# Patient Record
Sex: MALE | Race: BLACK OR AFRICAN AMERICAN | NOT HISPANIC OR LATINO | URBAN - METROPOLITAN AREA
[De-identification: names, ages, dates, MRNs, and addresses within clinical notes are randomized per-mention and may not be internally consistent; named-entity substitution may affect disease eponyms.]

---

## 2018-05-09 ENCOUNTER — EMERGENCY (EMERGENCY)
Facility: HOSPITAL | Age: 23
LOS: 1 days | Discharge: HOME | End: 2018-05-09
Attending: EMERGENCY MEDICINE | Admitting: EMERGENCY MEDICINE
Payer: COMMERCIAL

## 2018-05-09 VITALS
OXYGEN SATURATION: 100 % | DIASTOLIC BLOOD PRESSURE: 72 MMHG | RESPIRATION RATE: 16 BRPM | TEMPERATURE: 98 F | SYSTOLIC BLOOD PRESSURE: 134 MMHG | HEART RATE: 60 BPM

## 2018-05-09 LAB
ALBUMIN SERPL ELPH-MCNC: 4.3 G/DL — SIGNIFICANT CHANGE UP (ref 3.3–5)
ALP SERPL-CCNC: 46 U/L — SIGNIFICANT CHANGE UP (ref 40–120)
ALT FLD-CCNC: 16 U/L — SIGNIFICANT CHANGE UP (ref 4–41)
AST SERPL-CCNC: 15 U/L — SIGNIFICANT CHANGE UP (ref 4–40)
BASOPHILS # BLD AUTO: 0.02 K/UL — SIGNIFICANT CHANGE UP (ref 0–0.2)
BASOPHILS NFR BLD AUTO: 0.4 % — SIGNIFICANT CHANGE UP (ref 0–2)
BILIRUB SERPL-MCNC: 0.4 MG/DL — SIGNIFICANT CHANGE UP (ref 0.2–1.2)
BUN SERPL-MCNC: 13 MG/DL — SIGNIFICANT CHANGE UP (ref 7–23)
CALCIUM SERPL-MCNC: 9.1 MG/DL — SIGNIFICANT CHANGE UP (ref 8.4–10.5)
CHLORIDE SERPL-SCNC: 102 MMOL/L — SIGNIFICANT CHANGE UP (ref 98–107)
CO2 SERPL-SCNC: 29 MMOL/L — SIGNIFICANT CHANGE UP (ref 22–31)
CREAT SERPL-MCNC: 0.93 MG/DL — SIGNIFICANT CHANGE UP (ref 0.5–1.3)
EOSINOPHIL # BLD AUTO: 0.77 K/UL — HIGH (ref 0–0.5)
EOSINOPHIL NFR BLD AUTO: 16.6 % — HIGH (ref 0–6)
GLUCOSE SERPL-MCNC: 98 MG/DL — SIGNIFICANT CHANGE UP (ref 70–99)
HCT VFR BLD CALC: 43.3 % — SIGNIFICANT CHANGE UP (ref 39–50)
HGB BLD-MCNC: 14.8 G/DL — SIGNIFICANT CHANGE UP (ref 13–17)
IMM GRANULOCYTES # BLD AUTO: 0.01 # — SIGNIFICANT CHANGE UP
IMM GRANULOCYTES NFR BLD AUTO: 0.2 % — SIGNIFICANT CHANGE UP (ref 0–1.5)
LIDOCAIN IGE QN: 16.6 U/L — SIGNIFICANT CHANGE UP (ref 7–60)
LYMPHOCYTES # BLD AUTO: 1.51 K/UL — SIGNIFICANT CHANGE UP (ref 1–3.3)
LYMPHOCYTES # BLD AUTO: 32.5 % — SIGNIFICANT CHANGE UP (ref 13–44)
MCHC RBC-ENTMCNC: 27.5 PG — SIGNIFICANT CHANGE UP (ref 27–34)
MCHC RBC-ENTMCNC: 34.2 % — SIGNIFICANT CHANGE UP (ref 32–36)
MCV RBC AUTO: 80.3 FL — SIGNIFICANT CHANGE UP (ref 80–100)
MONOCYTES # BLD AUTO: 0.38 K/UL — SIGNIFICANT CHANGE UP (ref 0–0.9)
MONOCYTES NFR BLD AUTO: 8.2 % — SIGNIFICANT CHANGE UP (ref 2–14)
NEUTROPHILS # BLD AUTO: 1.96 K/UL — SIGNIFICANT CHANGE UP (ref 1.8–7.4)
NEUTROPHILS NFR BLD AUTO: 42.1 % — LOW (ref 43–77)
NRBC # FLD: 0 — SIGNIFICANT CHANGE UP
PLATELET # BLD AUTO: 267 K/UL — SIGNIFICANT CHANGE UP (ref 150–400)
PMV BLD: 9.2 FL — SIGNIFICANT CHANGE UP (ref 7–13)
POTASSIUM SERPL-MCNC: 4.2 MMOL/L — SIGNIFICANT CHANGE UP (ref 3.5–5.3)
POTASSIUM SERPL-SCNC: 4.2 MMOL/L — SIGNIFICANT CHANGE UP (ref 3.5–5.3)
PROT SERPL-MCNC: 7 G/DL — SIGNIFICANT CHANGE UP (ref 6–8.3)
RBC # BLD: 5.39 M/UL — SIGNIFICANT CHANGE UP (ref 4.2–5.8)
RBC # FLD: 12.7 % — SIGNIFICANT CHANGE UP (ref 10.3–14.5)
SODIUM SERPL-SCNC: 141 MMOL/L — SIGNIFICANT CHANGE UP (ref 135–145)
WBC # BLD: 4.65 K/UL — SIGNIFICANT CHANGE UP (ref 3.8–10.5)
WBC # FLD AUTO: 4.65 K/UL — SIGNIFICANT CHANGE UP (ref 3.8–10.5)

## 2018-05-09 PROCEDURE — 99283 EMERGENCY DEPT VISIT LOW MDM: CPT

## 2018-05-09 RX ORDER — ONDANSETRON 8 MG/1
4 TABLET, FILM COATED ORAL ONCE
Qty: 0 | Refills: 0 | Status: COMPLETED | OUTPATIENT
Start: 2018-05-09 | End: 2018-05-09

## 2018-05-09 RX ORDER — FAMOTIDINE 10 MG/ML
20 INJECTION INTRAVENOUS ONCE
Qty: 0 | Refills: 0 | Status: COMPLETED | OUTPATIENT
Start: 2018-05-09 | End: 2018-05-09

## 2018-05-09 RX ADMIN — FAMOTIDINE 20 MILLIGRAM(S): 10 INJECTION INTRAVENOUS at 08:56

## 2018-05-09 RX ADMIN — Medication 30 MILLILITER(S): at 08:56

## 2018-05-09 RX ADMIN — ONDANSETRON 4 MILLIGRAM(S): 8 TABLET, FILM COATED ORAL at 08:56

## 2018-05-09 NOTE — ED ADULT TRIAGE NOTE - CHIEF COMPLAINT QUOTE
Pt. with c/o abdominal pain x 5 days acc with nausea.  Pt. stated he took a laxative and started having some loose stools.  Last stated his last normal BM 3 days ago.

## 2018-05-09 NOTE — ED PROVIDER NOTE - PROGRESS NOTE DETAILS
pain improved, tolerating po, All results d/w patient and copies given with instructions to bring with them to their follow up appointment.  The patient was given verbal and written discharge instructions Specifically, instructions when to return to the ED and to seek follow-up from their pcp within 1-2 days. GI follow-up was discussed, including how to make an appointment.  Instructions were discussed in simple, plain language and was understood by the patient. The patient understands that should their symptoms worsen or any new symptoms arise, they should return to the ED immediately for further evaluation.  Vss, NAD, tolerating po and ambulating steadily at discharge.

## 2018-05-09 NOTE — ED PROVIDER NOTE - MEDICAL DECISION MAKING DETAILS
24 y/o M w/ 5 days of intermittent abd pain, constipation followed by diarrhea after using laxative. Well appearing. Tolerating PO. Benign exam. Will check labs, symptomatically treat and reassess. Likely will require GI follow up.

## 2018-05-09 NOTE — ED PROVIDER NOTE - OBJECTIVE STATEMENT
22 y/o M w/ no significant PMHx, presents to the ED c/o intermittent abd pain x6 days w/ associated nausea. Pain is intermittent and worse after eating. Pt also reports fever (Tmax 101F) and chills. Pt admits to mild constipation which was followed by diarrhea after using laxative 2 days ago. Pt is tolerating PO. Denies vomiting, back pain, dysuria or any other complaints. 24 y/o M w/ no significant PMHx, presents to the ED c/o intermittent abd pain x6 days w/ associated nausea. Pain is intermittent and worse after eating, worse in epigastrium but radiates diffusely. Pt also with chills. Pt admits to mild constipation which was followed by diarrhea after using laxative 2 days ago, now improved. Pt is tolerating PO. Denies vomiting, back pain, dysuria or any other complaints. No recent travel or sick contacts. 22 y/o M w/ no significant PMHx, presents to the ED c/o intermittent abd pain x6 days w/ associated nausea. Pain is intermittent and worse after eating, worse in epigastrium but radiates diffusely. Pt admits to mild constipation which was followed by diarrhea after using laxative 2 days ago, now improved. Pt is tolerating PO. Denies vomiting, back pain, dysuria or any other complaints. No recent travel or sick contacts.

## 2018-05-23 ENCOUNTER — EMERGENCY (EMERGENCY)
Facility: HOSPITAL | Age: 23
LOS: 1 days | Discharge: HOME | End: 2018-05-23
Attending: EMERGENCY MEDICINE | Admitting: EMERGENCY MEDICINE
Payer: COMMERCIAL

## 2018-05-23 VITALS
DIASTOLIC BLOOD PRESSURE: 74 MMHG | SYSTOLIC BLOOD PRESSURE: 139 MMHG | HEART RATE: 104 BPM | RESPIRATION RATE: 18 BRPM | OXYGEN SATURATION: 100 % | TEMPERATURE: 100 F

## 2018-05-23 LAB
BASOPHILS # BLD AUTO: 0.04 K/UL — SIGNIFICANT CHANGE UP (ref 0–0.2)
BASOPHILS NFR BLD AUTO: 0.4 % — SIGNIFICANT CHANGE UP (ref 0–2)
EOSINOPHIL # BLD AUTO: 1.49 K/UL — HIGH (ref 0–0.5)
EOSINOPHIL NFR BLD AUTO: 15.5 % — HIGH (ref 0–6)
HCT VFR BLD CALC: 41.8 % — SIGNIFICANT CHANGE UP (ref 39–50)
HGB BLD-MCNC: 14.2 G/DL — SIGNIFICANT CHANGE UP (ref 13–17)
IMM GRANULOCYTES # BLD AUTO: 0.03 # — SIGNIFICANT CHANGE UP
IMM GRANULOCYTES NFR BLD AUTO: 0.3 % — SIGNIFICANT CHANGE UP (ref 0–1.5)
LYMPHOCYTES # BLD AUTO: 1.21 K/UL — SIGNIFICANT CHANGE UP (ref 1–3.3)
LYMPHOCYTES # BLD AUTO: 12.6 % — LOW (ref 13–44)
MCHC RBC-ENTMCNC: 26.9 PG — LOW (ref 27–34)
MCHC RBC-ENTMCNC: 34 % — SIGNIFICANT CHANGE UP (ref 32–36)
MCV RBC AUTO: 79.2 FL — LOW (ref 80–100)
MONOCYTES # BLD AUTO: 0.73 K/UL — SIGNIFICANT CHANGE UP (ref 0–0.9)
MONOCYTES NFR BLD AUTO: 7.6 % — SIGNIFICANT CHANGE UP (ref 2–14)
NEUTROPHILS # BLD AUTO: 6.13 K/UL — SIGNIFICANT CHANGE UP (ref 1.8–7.4)
NEUTROPHILS NFR BLD AUTO: 63.6 % — SIGNIFICANT CHANGE UP (ref 43–77)
NRBC # FLD: 0 — SIGNIFICANT CHANGE UP
PLATELET # BLD AUTO: 256 K/UL — SIGNIFICANT CHANGE UP (ref 150–400)
PMV BLD: 9.9 FL — SIGNIFICANT CHANGE UP (ref 7–13)
RBC # BLD: 5.28 M/UL — SIGNIFICANT CHANGE UP (ref 4.2–5.8)
RBC # FLD: 12.9 % — SIGNIFICANT CHANGE UP (ref 10.3–14.5)
WBC # BLD: 9.63 K/UL — SIGNIFICANT CHANGE UP (ref 3.8–10.5)
WBC # FLD AUTO: 9.63 K/UL — SIGNIFICANT CHANGE UP (ref 3.8–10.5)

## 2018-05-23 PROCEDURE — 99285 EMERGENCY DEPT VISIT HI MDM: CPT

## 2018-05-23 RX ORDER — ACETAMINOPHEN 500 MG
650 TABLET ORAL ONCE
Qty: 0 | Refills: 0 | Status: COMPLETED | OUTPATIENT
Start: 2018-05-23 | End: 2018-05-23

## 2018-05-23 RX ORDER — MORPHINE SULFATE 50 MG/1
4 CAPSULE, EXTENDED RELEASE ORAL ONCE
Qty: 0 | Refills: 0 | Status: DISCONTINUED | OUTPATIENT
Start: 2018-05-23 | End: 2018-05-23

## 2018-05-23 RX ORDER — SODIUM CHLORIDE 9 MG/ML
1000 INJECTION INTRAMUSCULAR; INTRAVENOUS; SUBCUTANEOUS ONCE
Qty: 0 | Refills: 0 | Status: COMPLETED | OUTPATIENT
Start: 2018-05-23 | End: 2018-05-23

## 2018-05-23 RX ADMIN — Medication 650 MILLIGRAM(S): at 23:36

## 2018-05-23 RX ADMIN — MORPHINE SULFATE 4 MILLIGRAM(S): 50 CAPSULE, EXTENDED RELEASE ORAL at 23:35

## 2018-05-23 RX ADMIN — Medication 650 MILLIGRAM(S): at 23:50

## 2018-05-23 RX ADMIN — SODIUM CHLORIDE 1000 MILLILITER(S): 9 INJECTION INTRAMUSCULAR; INTRAVENOUS; SUBCUTANEOUS at 23:35

## 2018-05-23 RX ADMIN — MORPHINE SULFATE 4 MILLIGRAM(S): 50 CAPSULE, EXTENDED RELEASE ORAL at 23:40

## 2018-05-23 NOTE — ED PROVIDER NOTE - PROGRESS NOTE DETAILS
LEANNE ACOSTA:  Pt notes feeling better.  Pt requesting to go home.  CT negative for colitis.  Pt has not had any diarrhea in ED.  Pt medically stable for discharge. Pt to follow up with PMD and gastroenterology (referral list provided).  Repeat abdominal exam:  Abdomen soft, nontender without guarding or rigidity; normoactive bowel sounds.

## 2018-05-23 NOTE — ED ADULT NURSE NOTE - OBJECTIVE STATEMENT
Pt rcvd to INT5 c/o generalized abd pain x4 weeks.  Was seen in ED x2 wks ago, had lab workup and nothing found emergent.  Pt returns for non-improving symptoms.  Also reports x2 days of chest congestion and fevers/chills.  Oral temp 100.2 at present, HR Stachy 100.  Resps even/unlabored on RA, BP Stable, pt well appearing.  No significant pmhx or hx abdominal surgeries,  no hx urinary symptoms.  IVL placed to R AC #20g, labs drawn/sent, medicated as ordered, IVF NS Bolus infusing.  Pt given po contrast, family and pt updated to plan of care - awaiting CT abd and lab results.  Will CTM closely.

## 2018-05-23 NOTE — ED PROVIDER NOTE - SHIFT CHANGE DETAILS
I have signed over this patient to the above attending physician. Pertinent history, physical exam findings and workup thus far in the ED have been discussed. The pending tests and plan, including CTAP were signed over.  All questions from the above attending physician have been answered.

## 2018-05-23 NOTE — ED PROVIDER NOTE - OBJECTIVE STATEMENT
22 y/o M with no PMHx p/w recurrent sx of diffuse abdominal pain similar to previous presentation 2 weeks prior. Pt received blood workup and GI meds at that time with mild improvement of sx. Pt notes he is unable to tolerate PO a/w decrease urinary output. He notes the abdominal pain is worse in the periumbilical region and left side of the abdomen. Pt also expresses he feels dizzy when ambulating, He notes he drinks plenty of fluids but still notices his urine to be dark yellow. Denies N/V/D, F/C, CP, SOB or any other acute complaints.

## 2018-05-23 NOTE — ED PROVIDER NOTE - CARE PLAN
Principal Discharge DX:	Abdominal pain  Assessment and plan of treatment:	Advance activity as tolerated.  Continue all previously prescribed medications as directed unless otherwise instructed.  Take probiotics as directed.  Take Motrin (also sold as Advil or Ibuprofen) 400-600 mg (two or three 200 mg over the counter pills) every 8 hours as needed for moderate pain -- take with food. Take Tylenol 650mg (Two 325 mg pills) every 4-6 hours as needed for pain. Follow up with your primary care physician and gastroenterology (referral list provided) in 48-72 hours- bring copies of your results.  Return to the ER for worsening or persistent symptoms, including but no limited to fevers, bloody stools, worsening/persistent abdominal pain and/or ANY NEW OR CONCERNING SYMPTOMS. If you have issues obtaining follow up, please call: [phone] to obtain a doctor or specialist who takes your insurance in your area.  You may call [phone] to make an appointment with the internal medicine clinic.

## 2018-05-23 NOTE — ED PROVIDER NOTE - ATTENDING CONTRIBUTION TO CARE
I performed a face to face bedside interview with patient regarding history of present illness, review of symptoms and past medical history. I completed an independent physical exam.  I have discussed patient's plan of care.   I agree with note as stated above, having amended the EMR as needed to reflect my findings. I have discussed the assessment and plan of care.  This includes during the time I functioned as the attending physician for this patient.  Attending Contribution to Care: agree with plan of PA. pt was initially evaluated by self and signed out to dr wade. pt is pending ct for final disposition. labs wnl with no acute findings.

## 2018-05-24 VITALS
OXYGEN SATURATION: 100 % | SYSTOLIC BLOOD PRESSURE: 101 MMHG | HEART RATE: 78 BPM | DIASTOLIC BLOOD PRESSURE: 50 MMHG | TEMPERATURE: 99 F

## 2018-05-24 LAB
ALBUMIN SERPL ELPH-MCNC: 4.2 G/DL — SIGNIFICANT CHANGE UP (ref 3.3–5)
ALP SERPL-CCNC: 52 U/L — SIGNIFICANT CHANGE UP (ref 40–120)
ALT FLD-CCNC: 14 U/L — SIGNIFICANT CHANGE UP (ref 4–41)
AST SERPL-CCNC: 17 U/L — SIGNIFICANT CHANGE UP (ref 4–40)
BILIRUB SERPL-MCNC: 0.7 MG/DL — SIGNIFICANT CHANGE UP (ref 0.2–1.2)
BUN SERPL-MCNC: 9 MG/DL — SIGNIFICANT CHANGE UP (ref 7–23)
CALCIUM SERPL-MCNC: 9.1 MG/DL — SIGNIFICANT CHANGE UP (ref 8.4–10.5)
CHLORIDE SERPL-SCNC: 97 MMOL/L — LOW (ref 98–107)
CO2 SERPL-SCNC: 26 MMOL/L — SIGNIFICANT CHANGE UP (ref 22–31)
CREAT SERPL-MCNC: 1.06 MG/DL — SIGNIFICANT CHANGE UP (ref 0.5–1.3)
CRP SERPL-MCNC: 4.4 MG/L — SIGNIFICANT CHANGE UP
ERYTHROCYTE [SEDIMENTATION RATE] IN BLOOD: 2 MM/HR — SIGNIFICANT CHANGE UP (ref 1–15)
GLUCOSE SERPL-MCNC: 97 MG/DL — SIGNIFICANT CHANGE UP (ref 70–99)
LIDOCAIN IGE QN: 13.8 U/L — SIGNIFICANT CHANGE UP (ref 7–60)
POTASSIUM SERPL-MCNC: 3.7 MMOL/L — SIGNIFICANT CHANGE UP (ref 3.5–5.3)
POTASSIUM SERPL-SCNC: 3.7 MMOL/L — SIGNIFICANT CHANGE UP (ref 3.5–5.3)
PROT SERPL-MCNC: 7.1 G/DL — SIGNIFICANT CHANGE UP (ref 6–8.3)
SODIUM SERPL-SCNC: 135 MMOL/L — SIGNIFICANT CHANGE UP (ref 135–145)

## 2018-05-24 PROCEDURE — 74177 CT ABD & PELVIS W/CONTRAST: CPT | Mod: 26

## 2018-05-24 RX ORDER — LACTOBACILLUS ACIDOPHILUS 100MM CELL
1 CAPSULE ORAL
Qty: 21 | Refills: 0
Start: 2018-05-24 | End: 2018-05-30

## 2018-05-24 NOTE — ED ADULT NURSE REASSESSMENT NOTE - NS ED NURSE REASSESS COMMENT FT1
Pt reassessed, improved, states desire to go home following multiple medications and ED interventions.  PA Kenn @ bedside to review dc instructions, iv access dc'd per routine at time of exit by PA, follow up instructed by PA.

## 2019-03-21 NOTE — ED PROVIDER NOTE - NS_ATTENDINGSCRIBE_ED_ALL_ED
Noted pt is running late for appointment. GRISELDA Leung RN   I personally performed the service described in the documentation recorded by the scribe in my presence, and it accurately and completely records my words and actions.

## 2019-05-07 ENCOUNTER — EMERGENCY (EMERGENCY)
Facility: HOSPITAL | Age: 24
LOS: 1 days | Discharge: HOME | End: 2019-05-07
Attending: EMERGENCY MEDICINE | Admitting: EMERGENCY MEDICINE
Payer: COMMERCIAL

## 2019-05-07 VITALS
HEART RATE: 81 BPM | OXYGEN SATURATION: 100 % | SYSTOLIC BLOOD PRESSURE: 124 MMHG | TEMPERATURE: 98 F | RESPIRATION RATE: 16 BRPM | DIASTOLIC BLOOD PRESSURE: 60 MMHG

## 2019-05-07 PROCEDURE — 99283 EMERGENCY DEPT VISIT LOW MDM: CPT

## 2019-05-07 RX ORDER — DEXAMETHASONE 0.5 MG/5ML
8 ELIXIR ORAL ONCE
Qty: 0 | Refills: 0 | Status: COMPLETED | OUTPATIENT
Start: 2019-05-07 | End: 2019-05-07

## 2019-05-07 RX ORDER — ONDANSETRON 8 MG/1
4 TABLET, FILM COATED ORAL ONCE
Qty: 0 | Refills: 0 | Status: COMPLETED | OUTPATIENT
Start: 2019-05-07 | End: 2019-05-07

## 2019-05-07 RX ORDER — IBUPROFEN 200 MG
600 TABLET ORAL ONCE
Qty: 0 | Refills: 0 | Status: COMPLETED | OUTPATIENT
Start: 2019-05-07 | End: 2019-05-07

## 2019-05-07 RX ADMIN — Medication 600 MILLIGRAM(S): at 14:21

## 2019-05-07 RX ADMIN — Medication 8 MILLIGRAM(S): at 14:21

## 2019-05-07 RX ADMIN — Medication 1 TABLET(S): at 14:22

## 2019-05-07 RX ADMIN — ONDANSETRON 4 MILLIGRAM(S): 8 TABLET, FILM COATED ORAL at 14:21

## 2019-05-07 NOTE — ED PROVIDER NOTE - ATTENDING CONTRIBUTION TO CARE
m[place]ado: 24 yr old male with frequent headache presents to ed c/o similar presentation of left sided headache with visual disturbances, photophobia x 2 days gradual in onset.  pt took some caffeine pills and feels better but employee told to come to ed. no fever, no neck stiffness, no rashes, no trauma, no fam hx of aneurysm, no vomiting.    *GEN:   comfortable, in no apparent distress, AOx3  *EYES:   PERRL, extra-occular movements intact  *CV:   regular rate and rhythm, normal S1/S2, no murmur  *RESP:   clear to auscultation bilaterally, non-labored, speaking in full sentences  *ABD:   soft, non tender, no guarding  *MSK:   no musculoskeletal tenderness, 5/5 strength, moving all extremity  *SKIN:   dry, intact, no rash  *NEURO:   AOx3, no focal weakness or loss of sensation, gait normal, GCS 15    a/p: migraine- migraine cocktail.  f/u with neurology.  rx fioricet

## 2019-05-07 NOTE — ED PROVIDER NOTE - OBJECTIVE STATEMENT
23yo M hx migraines self diagnosed here with 2 days of gradual onset headache similar to those in past but more severe. L sided head throbbing 23yo M hx migraines self diagnosed here with 2 days of gradual onset headache similar to those in past but more severe. L sided head throbbing, nausea no vomiting, tunnel vision, and dizziness. No head trauma. Has been taking excedrin and caffeine pills to minimal relief. Has not followed with a neurologist.

## 2019-05-07 NOTE — ED PROVIDER NOTE - CLINICAL SUMMARY MEDICAL DECISION MAKING FREE TEXT BOX
Headache similar to those in past. No red flag sx. Tx and reassess. Imaging and labs not indicated at this time of evaluation. Will likely send f/u with neurology.

## 2019-05-07 NOTE — ED PROVIDER NOTE - NSFOLLOWUPCLINICS_GEN_ALL_ED_FT
[facility]  Neurology  [address] [place] [address] [address] [place] [address]  Phone: [phone]  Fax:   Follow Up Time:

## 2019-05-07 NOTE — ED PROVIDER NOTE - NSFOLLOWUPINSTRUCTIONS_ED_ALL_ED_FT
- Follow up with neurology as discussed    - Return to the ED for new or worsening symptoms    - Rest, drink plenty of fluids.

## 2019-05-07 NOTE — ED PROVIDER NOTE - NS ED ROS FT
CONSTITUTIONAL: No fevers, no chills,  Eyes: see hpi  Ears: no ear drainage, no ear pain  Nose: no nasal congestion  Mouth/Throat: no sore throat  CV: No chest pain, no palpitations  PULM: No SOB, no cough  GI: No n/v/d, no abd pain  : no dysuria, no hematuria  SKIN: no rashes.  NEURO: no focal weakness or numbness  PSYCHIATRIC: no known mental health issues.

## 2019-05-09 ENCOUNTER — APPOINTMENT (OUTPATIENT)
Dept: NEUROLOGY | Facility: CLINIC | Age: 24
End: 2019-05-09
Payer: COMMERCIAL

## 2019-05-09 VITALS
BODY MASS INDEX: 29.62 KG/M2 | HEIGHT: 69 IN | WEIGHT: 200 LBS | RESPIRATION RATE: 16 BRPM | SYSTOLIC BLOOD PRESSURE: 120 MMHG | TEMPERATURE: 98.5 F | OXYGEN SATURATION: 98 % | DIASTOLIC BLOOD PRESSURE: 68 MMHG | HEART RATE: 68 BPM

## 2019-05-09 DIAGNOSIS — G43.109 MIGRAINE WITH AURA, NOT INTRACTABLE, W/OUT STATUS MIGRAINOSUS: ICD-10-CM

## 2019-05-09 PROCEDURE — 99204 OFFICE O/P NEW MOD 45 MIN: CPT

## 2019-05-09 RX ORDER — SUMATRIPTAN 100 MG/1
100 TABLET, FILM COATED ORAL
Qty: 9 | Refills: 5 | Status: ACTIVE | COMMUNITY
Start: 2019-05-09 | End: 1900-01-01

## 2019-05-09 NOTE — HISTORY OF PRESENT ILLNESS
[FreeTextEntry1] : 24 yr-old man began having headaches preceded by aura of visual loss 6 years ago. Headaches recently becam more severe, He averages 2 headaches per month. Headaches are frontal or on either side of head, throbbing and associated with photo and phonophobia. Excedrin in past helped.

## 2019-05-09 NOTE — PHYSICAL EXAM

## 2019-05-30 ENCOUNTER — APPOINTMENT (OUTPATIENT)
Dept: NEUROLOGY | Facility: CLINIC | Age: 24
End: 2019-05-30

## 2019-11-15 ENCOUNTER — APPOINTMENT (OUTPATIENT)
Dept: NEUROLOGY | Facility: CLINIC | Age: 24
End: 2019-11-15

## 2020-03-29 ENCOUNTER — TRANSCRIPTION ENCOUNTER (OUTPATIENT)
Age: 25
End: 2020-03-29

## 2022-10-11 NOTE — ED ADULT NURSE NOTE - CHIEF COMPLAINT QUOTE
Pt. c/o having abdominal pain x 4 weeks acc with nausea. denies any diarrhea. Pt. also c/o cough. normal...

## 2022-10-31 ENCOUNTER — INPATIENT (INPATIENT)
Facility: HOSPITAL | Age: 27
LOS: 4 days | Discharge: HOME | End: 2022-11-05
Attending: GENERAL ACUTE CARE HOSPITAL | Admitting: GENERAL ACUTE CARE HOSPITAL

## 2022-10-31 VITALS
TEMPERATURE: 98 F | SYSTOLIC BLOOD PRESSURE: 130 MMHG | OXYGEN SATURATION: 100 % | DIASTOLIC BLOOD PRESSURE: 79 MMHG | RESPIRATION RATE: 16 BRPM | HEART RATE: 73 BPM

## 2022-10-31 DIAGNOSIS — R55 SYNCOPE AND COLLAPSE: ICD-10-CM

## 2022-10-31 DIAGNOSIS — R00.2 PALPITATIONS: ICD-10-CM

## 2022-10-31 LAB
ALBUMIN SERPL ELPH-MCNC: 5.2 G/DL — HIGH (ref 3.3–5)
ALP SERPL-CCNC: 59 U/L — SIGNIFICANT CHANGE UP (ref 40–120)
ALT FLD-CCNC: 19 U/L — SIGNIFICANT CHANGE UP (ref 4–41)
ANION GAP SERPL CALC-SCNC: 12 MMOL/L — SIGNIFICANT CHANGE UP (ref 7–14)
AST SERPL-CCNC: 20 U/L — SIGNIFICANT CHANGE UP (ref 4–40)
BASOPHILS # BLD AUTO: 0.03 K/UL — SIGNIFICANT CHANGE UP (ref 0–0.2)
BASOPHILS NFR BLD AUTO: 0.7 % — SIGNIFICANT CHANGE UP (ref 0–2)
BILIRUB SERPL-MCNC: 0.6 MG/DL — SIGNIFICANT CHANGE UP (ref 0.2–1.2)
BUN SERPL-MCNC: 14 MG/DL — SIGNIFICANT CHANGE UP (ref 7–23)
CALCIUM SERPL-MCNC: 9.9 MG/DL — SIGNIFICANT CHANGE UP (ref 8.4–10.5)
CHLORIDE SERPL-SCNC: 101 MMOL/L — SIGNIFICANT CHANGE UP (ref 98–107)
CO2 SERPL-SCNC: 25 MMOL/L — SIGNIFICANT CHANGE UP (ref 22–31)
CREAT SERPL-MCNC: 0.84 MG/DL — SIGNIFICANT CHANGE UP (ref 0.5–1.3)
EGFR: 123 ML/MIN/1.73M2 — SIGNIFICANT CHANGE UP
EOSINOPHIL # BLD AUTO: 0.09 K/UL — SIGNIFICANT CHANGE UP (ref 0–0.5)
EOSINOPHIL NFR BLD AUTO: 2 % — SIGNIFICANT CHANGE UP (ref 0–6)
FLUAV AG NPH QL: SIGNIFICANT CHANGE UP
FLUBV AG NPH QL: SIGNIFICANT CHANGE UP
GLUCOSE SERPL-MCNC: 91 MG/DL — SIGNIFICANT CHANGE UP (ref 70–99)
HCT VFR BLD CALC: 46 % — SIGNIFICANT CHANGE UP (ref 39–50)
HGB BLD-MCNC: 15.8 G/DL — SIGNIFICANT CHANGE UP (ref 13–17)
IANC: 2.02 K/UL — SIGNIFICANT CHANGE UP (ref 1.8–7.4)
IMM GRANULOCYTES NFR BLD AUTO: 0.2 % — SIGNIFICANT CHANGE UP (ref 0–0.9)
LYMPHOCYTES # BLD AUTO: 1.91 K/UL — SIGNIFICANT CHANGE UP (ref 1–3.3)
LYMPHOCYTES # BLD AUTO: 43.4 % — SIGNIFICANT CHANGE UP (ref 13–44)
MCHC RBC-ENTMCNC: 27.8 PG — SIGNIFICANT CHANGE UP (ref 27–34)
MCHC RBC-ENTMCNC: 34.3 GM/DL — SIGNIFICANT CHANGE UP (ref 32–36)
MCV RBC AUTO: 81 FL — SIGNIFICANT CHANGE UP (ref 80–100)
MONOCYTES # BLD AUTO: 0.34 K/UL — SIGNIFICANT CHANGE UP (ref 0–0.9)
MONOCYTES NFR BLD AUTO: 7.7 % — SIGNIFICANT CHANGE UP (ref 2–14)
NEUTROPHILS # BLD AUTO: 2.02 K/UL — SIGNIFICANT CHANGE UP (ref 1.8–7.4)
NEUTROPHILS NFR BLD AUTO: 46 % — SIGNIFICANT CHANGE UP (ref 43–77)
NRBC # BLD: 0 /100 WBCS — SIGNIFICANT CHANGE UP (ref 0–0)
NRBC # FLD: 0 K/UL — SIGNIFICANT CHANGE UP (ref 0–0)
PLATELET # BLD AUTO: 301 K/UL — SIGNIFICANT CHANGE UP (ref 150–400)
POTASSIUM SERPL-MCNC: 4.1 MMOL/L — SIGNIFICANT CHANGE UP (ref 3.5–5.3)
POTASSIUM SERPL-SCNC: 4.1 MMOL/L — SIGNIFICANT CHANGE UP (ref 3.5–5.3)
PROT SERPL-MCNC: 8 G/DL — SIGNIFICANT CHANGE UP (ref 6–8.3)
RBC # BLD: 5.68 M/UL — SIGNIFICANT CHANGE UP (ref 4.2–5.8)
RBC # FLD: 13 % — SIGNIFICANT CHANGE UP (ref 10.3–14.5)
RSV RNA NPH QL NAA+NON-PROBE: SIGNIFICANT CHANGE UP
SARS-COV-2 RNA SPEC QL NAA+PROBE: SIGNIFICANT CHANGE UP
SODIUM SERPL-SCNC: 138 MMOL/L — SIGNIFICANT CHANGE UP (ref 135–145)
T4 AB SER-ACNC: 6.48 UG/DL — SIGNIFICANT CHANGE UP (ref 5.1–13)
T4/T3 UPTAKE INDEX SERPL: 1.07 TBI — SIGNIFICANT CHANGE UP (ref 0.8–1.3)
TROPONIN T, HIGH SENSITIVITY RESULT: 7 NG/L — SIGNIFICANT CHANGE UP
TSH SERPL-MCNC: 1.59 UIU/ML — SIGNIFICANT CHANGE UP (ref 0.27–4.2)
WBC # BLD: 4.4 K/UL — SIGNIFICANT CHANGE UP (ref 3.8–10.5)
WBC # FLD AUTO: 4.4 K/UL — SIGNIFICANT CHANGE UP (ref 3.8–10.5)

## 2022-10-31 PROCEDURE — 99285 EMERGENCY DEPT VISIT HI MDM: CPT

## 2022-10-31 PROCEDURE — 71046 X-RAY EXAM CHEST 2 VIEWS: CPT | Mod: 26

## 2022-10-31 PROCEDURE — 99223 1ST HOSP IP/OBS HIGH 75: CPT

## 2022-10-31 PROCEDURE — 93010 ELECTROCARDIOGRAM REPORT: CPT

## 2022-10-31 RX ORDER — ONDANSETRON 8 MG/1
4 TABLET, FILM COATED ORAL EVERY 8 HOURS
Refills: 0 | Status: DISCONTINUED | OUTPATIENT
Start: 2022-10-31 | End: 2022-11-05

## 2022-10-31 RX ORDER — ALPRAZOLAM 0.25 MG
1 TABLET ORAL ONCE
Refills: 0 | Status: DISCONTINUED | OUTPATIENT
Start: 2022-10-31 | End: 2022-10-31

## 2022-10-31 RX ORDER — ACETAMINOPHEN 500 MG
650 TABLET ORAL EVERY 6 HOURS
Refills: 0 | Status: DISCONTINUED | OUTPATIENT
Start: 2022-10-31 | End: 2022-11-05

## 2022-10-31 RX ORDER — SODIUM CHLORIDE 9 MG/ML
1000 INJECTION, SOLUTION INTRAVENOUS ONCE
Refills: 0 | Status: COMPLETED | OUTPATIENT
Start: 2022-10-31 | End: 2022-10-31

## 2022-10-31 RX ORDER — LANOLIN ALCOHOL/MO/W.PET/CERES
3 CREAM (GRAM) TOPICAL AT BEDTIME
Refills: 0 | Status: DISCONTINUED | OUTPATIENT
Start: 2022-10-31 | End: 2022-11-05

## 2022-10-31 RX ADMIN — SODIUM CHLORIDE 500 MILLILITER(S): 9 INJECTION, SOLUTION INTRAVENOUS at 22:27

## 2022-10-31 RX ADMIN — Medication 1 MILLIGRAM(S): at 16:21

## 2022-10-31 NOTE — ED ADULT TRIAGE NOTE - CHIEF COMPLAINT QUOTE
Pt states he had a panic/anxiety attack 2 weeks ago (never had before), then started having various symptoms over the past 2 weeks. C/o lack of appetite, not eating, losing 20lbs. C/o feeling palpitations, dizziness, followed by tunnel vision and "passing out". Denies falls or injury. Reports chest tightness at this time. Denies PMH

## 2022-10-31 NOTE — H&P ADULT - NSHPSOCIALHISTORY_GEN_ALL_CORE
SOCIAL HISTORY:    Marital Status:  (  )    (  ) Single        (  )        (  )        (  )   Lives with:        (  ) Alone       (  ) Spouse      (  ) Children        (  ) Parents           (  ) Other  Occupation:     No history of smoking  No history of alcohol abuse  No history of illegal drug use SOCIAL HISTORY:    Marital Status:  (  )    ( x ) Single        (  )        (  )        (  )   Lives with:        ( x ) Alone       (  ) Spouse      (  ) Children        (  ) Parents           (  ) Other  Occupation:     History of marijuana smoking; quit ~ 3 weeks ago (approximately 10/11/2022)  No history of cigarette smoking  No history of alcohol abuse  No history of illegal drug use

## 2022-10-31 NOTE — H&P ADULT - NSICDXFAMILYHX_GEN_ALL_CORE_FT
FAMILY HISTORY:  Father  Still living? Unknown  Family history of hypertension, Age at diagnosis: Age Unknown    Mother  Still living? Unknown  Family history of hypertension, Age at diagnosis: Age Unknown    Grandparent  Still living? Unknown  Family history of diabetes mellitus, Age at diagnosis: Age Unknown  Family history of hypertension, Age at diagnosis: Age Unknown

## 2022-10-31 NOTE — H&P ADULT - PROBLEM SELECTOR PLAN 2
- along with diaphoresis and other associated s/s noted above  - stopped smoking marijuana at the initial cluster of s/s ~ 3 weeks ago  - no chest pain.  Some shortness of breath after regaining consciousness  - mildly dehydrated, but unlikely this is contributing to current complaints  - Troponin = 7, TSH = 1.59, ECG = Sinus bradycardia w/arrhythmia at 59 bpm, QTc = 392, TSH = 1.59  - no overt electrolyte abnormalities  - possibility of anxiety; first episode resolved after pacing back and forth  - f/u TTE (ordered)  - f/u lab-work  - f/u U-tox (ordered)  - f/u thyroid lab-work (In progress; previously ordered) - along with diaphoresis and other associated s/s noted above  - stopped smoking marijuana at the initial cluster of s/s ~ 3 weeks ago  - no chest pain.  Some shortness of breath after regaining consciousness  - mildly dehydrated, but unlikely this is contributing to current complaints  - Trop = 7, TSH = 1.59, ECG = Sinus bradycardia w/arrhythmia at 59 bpm, QTc = 392, TSH = 1.59  - no overt electrolyte abnormalities  - possibility of anxiety; first episode resolved after pacing back and forth  - f/u TTE (ordered)  - f/u lab-work  - f/u U-tox (ordered)  - f/u thyroid lab-work (In progress; previously ordered)

## 2022-10-31 NOTE — H&P ADULT - PROBLEM SELECTOR PLAN 5
- mildly dry oral mucosa.  Lab-work appears hemoconcentrated  - in the setting of persistent watery diarrhea and decreased oral intake (has to force himself re oral intake)  - IVF hydration as above (eval need for additional IVF)  - encourage oral hydration, as above  - f/u electrolytes

## 2022-10-31 NOTE — H&P ADULT - PROBLEM SELECTOR PLAN 3
- ~ 3 weeks, and associated with the signs/symptoms indicated above  - unclear etiology  - patient with signs of dehydration  - IVF hydration prescribed; LR one liter bolus, followed by LR at 125 mL/Hr x 8 hours  - f/u electrolytes and other lab-work (as above)  - f/u stool culture (ordered)  - if no improvement, could get GI input - ~ 3 weeks, and associated with the signs/symptoms indicated above  - unclear etiology  - takes probiotic daily (holding for now since taking w/o improvement)  - patient with signs of dehydration  - IVF hydration prescribed; LR one liter bolus, followed by LR at 125 mL/Hr x 8 hours  - f/u electrolytes and other lab-work (as above)  - f/u stool culture (ordered)  - if no improvement, could get GI input

## 2022-10-31 NOTE — ED PROVIDER NOTE - ATTENDING APP SHARED VISIT CONTRIBUTION OF CARE
Generally healthy male presents to the emergency department with 2 to 3 weeks of episodes of diaphoresis, palpitations, tunnel vision and syncope.  Denies chest pain or shortness of breath.  Does note that he does have intermittent headaches, however no numbness, tingling, weakness, vision changes, vomiting.  On exam he is well-appearing, currently asymptomatic, EKG within normal limits, nonfocal neurological exam and normal cardiopulmonary exam.  Plan for labs w/ trop, TSH and likely admission for work-up given concerning nature of symptoms with syncope.

## 2022-10-31 NOTE — ED PROVIDER NOTE - NS ED ATTENDING STATEMENT MOD
This was a shared visit with the MOODY. I reviewed and verified the documentation and independently performed the documented:

## 2022-10-31 NOTE — H&P ADULT - ASSESSMENT
[ x ]  Lab studies personally reviewed  [ x ]  Radiology personally reviewed  [ x ]  Old records personally reviewed     [ x ]  Lab studies personally reviewed  [ x ]  Radiology personally reviewed  [ x ]  Old records personally reviewed    27 year old male, with past history significant for Migraine headaches, presented to the ED secondary to signs/symptoms similar to that of a panic attack, as well as chest tightness.  Diagnosed with Syncope and Palpitations in the ED.

## 2022-10-31 NOTE — ED ADULT NURSE NOTE - OBJECTIVE STATEMENT
Patient received in chair. AOX4. Respirations even and unlabored. Presents to ER c/o multiple complaints. As per patient he was smoking weed about 2 weeks ago and has experienced what felt like a "panic attack" As per patient he has never had a panic attack before. Reports palpitations, feeling of anxiety, hot flashes. No signs of acute distress noted. Labs drawn. Evaluated by ER MD. Comfort and safety maintained. Will continue to monitor Galileo

## 2022-10-31 NOTE — H&P ADULT - NSHPLABSRESULTS_GEN_ALL_CORE
LAB-WORK/STUDIES:                          15.8   4.40  )-----------( 301      ( 31 Oct 2022 15:09 )             46.0     31 Oct 2022 15:09    138    |  101    |  14     ----------------------------<  91     4.1     |  25     |  0.84     Ca    9.9        31 Oct 2022 15:09    TPro  8.0    /  Alb  5.2    /  TBili  0.6    /  DBili  x      /  AST  20     /  ALT  19     /  AlkPhos  59     31 Oct 2022 15:09    LIVER FUNCTIONS - ( 31 Oct 2022 15:09 )  Alb: 5.2 g/dL / Pro: 8.0 g/dL / ALK PHOS: 59 U/L / ALT: 19 U/L / AST: 20 U/L / GGT: x           CAPILLARY BLOOD GLUCOSE    =======================================================        =======================================================  . LAB-WORK/STUDIES:                          15.8   4.40  )-----------( 301      ( 31 Oct 2022 15:09 )             46.0     31 Oct 2022 15:09    138    |  101    |  14     ----------------------------<  91     4.1     |  25     |  0.84     Ca    9.9        31 Oct 2022 15:09    TPro  8.0    /  Alb  5.2    /  TBili  0.6    /  DBili  x      /  AST  20     /  ALT  19     /  AlkPhos  59     31 Oct 2022 15:09    LIVER FUNCTIONS - ( 31 Oct 2022 15:09 )  Alb: 5.2 g/dL / Pro: 8.0 g/dL / ALK PHOS: 59 U/L / ALT: 19 U/L / AST: 20 U/L / GGT: x           CAPILLARY BLOOD GLUCOSE    =======================================================    ECG = Sinus bradycardia w/arrhythmia at 59 bpm, QTc = 392    =======================================================  .

## 2022-10-31 NOTE — ED PROVIDER NOTE - CLINICAL SUMMARY MEDICAL DECISION MAKING FREE TEXT BOX
28 y/o male with no pmhx presents to ED c/o multiple complaints. Pt states 2 weeks ago had his first "panic attack" with upset stomach, tremors, sweating, tunneled vision, palpitations and passed out. No fall or head trauma. Pt was sitting at home watching TV when this happened. Pt states he quiet smoking marijuana daily at that time. Had not smoked since. Pt has persistent episodes with same symptoms and anxiety. Pt also c/o 20 lb weight loss and loss of appetite within 3 weeks. +watery diarrhea. Pt has no PMD. EKG without arrythmia. pt well appearing nad in ED. concern for possible hyperthyroidism, plan to check labs tsh, t3/t4, admit for syncope and work  up.

## 2022-10-31 NOTE — H&P ADULT - PROBLEM SELECTOR PLAN 6
- low risk for DVT at present  - ambulate as tolerated - none x a couple of months, per patient  - does not report any prescription for same presently, but chart review notes patient was on butalbital/acetaminophen/caffeine 50 mg-300 mg-40 mg PO daily in 2019  - unclear if any association w/ current signs/symptoms  - evaluate for any recurrence

## 2022-10-31 NOTE — H&P ADULT - NSHPPHYSICALEXAM_GEN_ALL_CORE
Vital Signs Last 24 Hrs  T(C): 36.7 (31 Oct 2022 19:44), Max: 36.8 (31 Oct 2022 16:14)  T(F): 98 (31 Oct 2022 19:44), Max: 98.3 (31 Oct 2022 16:14)  HR: 72 (31 Oct 2022 19:44) (66 - 73)  BP: 120/75 (31 Oct 2022 19:44) (120/75 - 134/89)  BP(mean): --  RR: 18 (31 Oct 2022 19:44) (16 - 18)  SpO2: 100% (31 Oct 2022 19:44) (100% - 100%)    Parameters below as of 31 Oct 2022 19:44  Patient On (Oxygen Delivery Method): room air    ================================================================  . Vital Signs Last 24 Hrs  T(C): 36.7 (31 Oct 2022 19:44), Max: 36.8 (31 Oct 2022 16:14)  T(F): 98 (31 Oct 2022 19:44), Max: 98.3 (31 Oct 2022 16:14)  HR: 72 (31 Oct 2022 19:44) (66 - 73)  BP: 120/75 (31 Oct 2022 19:44) (120/75 - 134/89)  BP(mean): --  RR: 18 (31 Oct 2022 19:44) (16 - 18)  SpO2: 100% (31 Oct 2022 19:44) (100% - 100%)    Parameters below as of 31 Oct 2022 19:44  Patient On (Oxygen Delivery Method): room air    ================================================================  PHYSICAL EXAMINATION:    APPEARANCE: Adequately groomed, adequately nourished.  NAD	  HEENT: Mildly dry oral mucosa.  Pupils dilated, but reactive.  EOMI	  LYMPHATIC: No lymphadenopathy appreciated  CARDIOVASCULAR: (+) S1 S2.  No JVD.  No murmurs.  No edema  RESPIRATORY: No wheezing, rhonchi, crackles appreciated  GASTROINTESTINAL:  Soft, Non-tender, (+) BS  GENITOURINARY: No suprapubic tenderness.  No CVA tenderness B/L  EXTREMITIES: Normal range of motion.  No clubbing, cyanosis or edema  MUSCULOSKELETAL: No atrophy.  No asymmetry.  Good ROM  SKIN: No rashes. No ecchymoses.  No cyanosis  PSYCHIATRIC: A&O x 3.  Mood & affect appropriate to situation  NEUROLOGICAL: Non-focal, THOMPSON x 4 against gravity  VASCULAR: Peripheral pulses palpable Vital Signs Last 24 Hrs  T(C): 36.7 (31 Oct 2022 19:44), Max: 36.8 (31 Oct 2022 16:14)  T(F): 98 (31 Oct 2022 19:44), Max: 98.3 (31 Oct 2022 16:14)  HR: 72 (31 Oct 2022 19:44) (66 - 73)  BP: 120/75 (31 Oct 2022 19:44) (120/75 - 134/89)  BP(mean): --  RR: 18 (31 Oct 2022 19:44) (16 - 18)  SpO2: 100% (31 Oct 2022 19:44) (100% - 100%)    Parameters below as of 31 Oct 2022 19:44  Patient On (Oxygen Delivery Method): room air    ================================================================  PHYSICAL EXAMINATION:    APPEARANCE: Adequately groomed, adequately nourished.  NAD	  HEENT: Mildly dry oral mucosa.  Pupils dilated, but reactive.  EOMI	  LYMPHATIC: No lymphadenopathy appreciated  CARDIOVASCULAR: (+) S1 S2.  No JVD.  No murmurs.  No edema  RESPIRATORY: No wheezing, rhonchi, crackles appreciated  GASTROINTESTINAL:  Hyperactive bowel sounds.  Soft, Non-tender, (+) BS  GENITOURINARY: No suprapubic tenderness.  No CVA tenderness B/L  EXTREMITIES: Normal range of motion.  No clubbing, cyanosis or edema  MUSCULOSKELETAL: No atrophy.  No asymmetry.  Good ROM  SKIN: No rashes. No ecchymoses.  No cyanosis  PSYCHIATRIC: A&O x 3.  Mood & affect appropriate to situation  NEUROLOGICAL: Non-focal, THOMPSON x 4 against gravity  VASCULAR: Peripheral pulses palpable

## 2022-10-31 NOTE — ED PROVIDER NOTE - OBJECTIVE STATEMENT
28 y/o male with no pmhx presents to ED c/o multiple complaints. Pt states 2 weeks ago had his first "panic attack" with upset stomach, tremors, sweating, tunneled vision, palpitations and passed out. No fall or head trauma. Pt was sitting at home watching TV when this happened. Pt states he quiet smoking marijuana daily at that time. Had not smoked since. Pt has persistent episodes with same symptoms and anxiety. Pt also c/o 20 lb weight loss and loss of appetite within 3 weeks. +watery diarrhea. Pt has no PMD. No cp, sob, fevers, chills, abd pain, vomiting.

## 2022-10-31 NOTE — H&P ADULT - PROBLEM SELECTOR PLAN 4
- previously weight 195 to 200 lbs; now weighs ~ 169 lbs  - over the 3 weeks since onset of s/s noted above, complicated by loss of appetite, persistent watery diarrhea  - encourage oral nutrition and hydration, as tolerated  - f/u AM lab-work

## 2022-10-31 NOTE — H&P ADULT - NSHPREVIEWOFSYSTEMS_GEN_ALL_CORE
REVIEW OF SYSTEMS:    CONSTITUTIONAL: No weakness, fever, chills or sweating  EYES/ENT: No visual changes.  No dysphagia  NECK: No pain or stiffness  RESPIRATORY: No cough or hemoptysis.  No shortness of breath  CARDIOVASCULAR: Episodic palpitations, diaphoresis w/ subsequent LOC at times.  No chest pain.  No lower extremity edema  GASTROINTESTINAL: Watery diarrhea.  Nausea without vomiting.  No epigastric or abdominal pain. No nausea.  No hematemesis.  No diarrhea or constipation. No melena or hematochezia.  GENITOURINARY: No dysuria, frequency or hematuria  MUSCULOSKELETAL: No joint pain, swelling, decreased ROM, erythema, warmth  NEUROLOGICAL: No numbness or weakness  PSYCHIATRY: No anxiety, or depression.  SKIN: No itching, burning, rashes, or lesions   All other review of systems is negative unless indicated above.

## 2022-10-31 NOTE — H&P ADULT - PROBLEM SELECTOR PLAN 1
- unclear etiology  - after progressively worsening episodes of palpitations, diaphoresis, dizziness, spotty-->tunnelled-->then darkening of vision, then LOC.  Tremors and mild headache after regaining consciousness.  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 (w/o the above s/s), witnessed repeatedly by mother  - last smoked marijuana ~ 3 weeks ago at the initial onset of s/s/  - Trop = 7, ECG = Sinus bradycardia w/arrhythmia at 59 bpm, QTc = 392, TSH = 1.59  - no signs of infection appreciated  - given alprazolam 1 mg PO in the ED; continuing w/ alprazolam 0.5 mg PO Q8H PRN anxiety/panic attack  - f/u the remainder of labs for eval of thyroid (in progress)  - f/u U-tox (ordered)  - f/u TTE ordered (ordered)  - f/u 5HIAA, serotonin, chromogranin A, metanephrine  - Neurology consult in the AM for eval of possible associated seizure (?absence seizure)  - continues on Telemetry - unclear etiology  - after progressively worsening episodes of palpitations, diaphoresis, dizziness, spotty-->tunnelled-->then darkening of vision, then LOC.  Tremors and mild headache after regaining consciousness.  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 (w/o the above s/s), witnessed repeatedly by mother  - last smoked marijuana ~ 3 weeks ago at the initial onset of s/s  - only takes a MVI and a probiotic at home  - Trop = 7, ECG = Sinus bradycardia w/arrhythmia at 59 bpm, QTc = 392, TSH = 1.59  - no signs of infection appreciated  - given alprazolam 1 mg PO in the ED; continuing w/ alprazolam 0.5 mg PO Q8H PRN anxiety/panic attack  - f/u the remainder of labs for eval of thyroid (in progress)  - f/u U-tox (ordered)  - f/u TTE ordered (ordered)  - f/u 5HIAA, serotonin, chromogranin A, metanephrine  - Neurology consult in the AM for eval of possible associated seizure (?absence seizure)  - continues on Telemetry - unclear etiology  - after progressively worsening episodes of palpitations, diaphoresis, dizziness, spotty-->tunnelled-->then darkening of vision, then LOC.  Tremors and mild headache after regaining consciousness.  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 (w/o the above s/s), witnessed repeatedly by mother  - last smoked marijuana ~ 3 weeks ago at the initial onset of s/s  - only takes a MVI and a probiotic at home  - Trop = 7, ECG = Sinus bradycardia w/arrhythmia at 59 bpm, QTc = 392, TSH = 1.59  - no signs of infection appreciated  - given alprazolam 1 mg PO in the ED; continuing w/ alprazolam 0.5 mg PO Q8H PRN anxiety/panic attack  - f/u the remainder of labs for eval of thyroid (in progress)  - f/u U-tox (ordered)  - f/u TTE ordered (ordered)  - f/u 5HIAA, serotonin, chromogranin A, metanephrine (?pheochromocytoma, carcinoid tumor...)  - Neurology consult in the AM for eval of possible associated seizure (?absence seizure)  - continues on Telemetry

## 2022-10-31 NOTE — H&P ADULT - HISTORY OF PRESENT ILLNESS
27 year old male, with no significant past history, presented to the ED secondary to panic attack.  Seen and evaluated at bedside;    Vital signs upon ED presentation as follows: BP = 130/79, HR = 73, RR = 16, T = 36.6 C (97.9 F), O2 Sat = 100% on RA.  Diagnosed with Syncope and Palpitations and prescribed alprazolam 1 mg PO x one in the ED. 27 year old male, with past history significant for Migraine headaches, presented to the ED secondary to signs/symptoms similar to that of a panic attack, as well as chest tightness.  Seen and evaluated at bedside with mother present; NAD.  Patient relates episode of palpitations, sweating, dizziness, spotty vision with sensation of vision "closing in" then darkness with subsequent loss of consciousness approximately 3 weeks ago.  Wakens with generalized body tremor and residual headache.  Has had repeated occurrences of similar cluster of signs/symptoms, occurring twice daily at times, but with gradually worsening intensity.  No trauma related to LOC.  Reports that at times of onset, quickly getting to a chair and sitting down before passing out; usually wakens to find someone around him who had aided him as he lost consciousness.  Prior to the first episode involving LOC, patient had onset of palpitations with diaphoresis and anxiety; palpitations and anxiety subsided after paced the floor, back and forth.  Patient reports nausea without vomiting.  However, has been suffering with watery diarrhea since onset of above signs/symptoms.  Appreciable weight loss (usually weighs between 195 and 200 lbs, but is now ~ 169 lbs).  Has decreased appetite and has to force himself to take in nutrition and hydration.  Also, patient described cloudiness of mentation; occurs after loss of consciousness, but mother notes that patient, without the constellation of signs/symptoms will have a change in mentation; stares into space without comprehending what is happening around him.    Smokes marijuana, but has not done so for the past  3 weeks (after the first episode described above).  Does not believe the marijuana could have been laced with any other product.      Vital signs upon ED presentation as follows: BP = 130/79, HR = 73, RR = 16, T = 36.6 C (97.9 F), O2 Sat = 100% on RA.  Diagnosed with Syncope and Palpitations and prescribed alprazolam 1 mg PO x one in the ED. 27 year old male, with past history significant for Migraine headaches, presented to the ED secondary to signs/symptoms similar to that of a panic attack, as well as chest tightness.  Seen and evaluated at bedside with mother present; NAD.  Patient relates episode of palpitations, sweating, dizziness, spotty vision with sensation of vision "closing in" then darkness with subsequent loss of consciousness approximately 3 weeks ago.  Wakens with generalized body tremor and residual headache.  Has had repeated occurrences of similar cluster of signs/symptoms, occurring twice daily at times, but with gradually worsening intensity.  No trauma related to LOC.  Reports that at times of onset, quickly getting to a chair and sitting down before passing out; usually wakens to find someone around him who had aided him as he lost consciousness.  Prior to the first episode involving LOC, patient had onset of palpitations with diaphoresis and anxiety; palpitations and anxiety subsided after paced the floor, back and forth.  Patient reports nausea without vomiting.  However, has been suffering with watery diarrhea since onset of above signs/symptoms.  Appreciable weight loss (usually weighs between 195 and 200 lbs, but is now ~ 169 lbs).  Has decreased appetite and has to force himself to take in nutrition and hydration.  Does not take any herbal supplement, or other supplement (including body-building supplement).  Also, patient described cloudiness of mentation; occurs after loss of consciousness, but mother notes that patient, without the constellation of signs/symptoms will have a change in mentation; stares into space without comprehending what is happening around him.    Smokes marijuana, but has not done so for the past  3 weeks (after the first episode described above).  Does not believe the marijuana could have been laced with any other product.    NB - Patient and mother requesting family to be contacted prior to any decisions (especially those requiring signature) since patient has episodes of cloudy mentation w/o understanding/recalling concurrent events.    Vital signs upon ED presentation as follows: BP = 130/79, HR = 73, RR = 16, T = 36.6 C (97.9 F), O2 Sat = 100% on RA.  Diagnosed with Syncope and Palpitations and prescribed alprazolam 1 mg PO x one in the ED. 27 year old male, with past history significant for Migraine headaches, presented to the ED secondary to signs/symptoms similar to that of a panic attack, as well as chest tightness.  Seen and evaluated at bedside with mother present; NAD.  Patient relates episode of palpitations, sweating, dizziness, spotty vision with sensation of vision "closing in" then darkness with subsequent loss of consciousness approximately 3 weeks ago.  Wakens with generalized body tremor and residual headache.  Has had repeated occurrences of similar cluster of signs/symptoms, occurring twice daily at times, but with gradually worsening intensity.  No trauma related to LOC.  Reports that at times of onset, quickly getting to a chair and sitting down before passing out; usually wakens to find someone around him who had aided him as he lost consciousness.  Prior to the first episode involving LOC, patient had onset of palpitations with diaphoresis and anxiety - somewhat like a sense of doom; palpitations and anxiety subsided after paced the floor, back and forth.  Patient reports nausea without vomiting.  However, has been suffering with watery diarrhea since onset of above signs/symptoms.  Appreciable weight loss (usually weighs between 195 and 200 lbs, but is now ~ 169 lbs).  Has decreased appetite and has to force himself to take in nutrition and hydration.  Does not take any herbal supplement, or other supplement (including body-building supplement).  Also, patient described cloudiness of mentation; occurs after loss of consciousness, but mother notes that patient, without the constellation of signs/symptoms will have a change in mentation; stares into space without comprehending what is happening around him.    Smokes marijuana, but has not done so for the past  3 weeks (after the first episode described above).  Does not believe the marijuana could have been laced with any other product.    NB - Patient and mother requesting family to be contacted prior to any decisions (especially those requiring signature) since patient has episodes of cloudy mentation w/o understanding/recalling concurrent events.    Vital signs upon ED presentation as follows: BP = 130/79, HR = 73, RR = 16, T = 36.6 C (97.9 F), O2 Sat = 100% on RA.  Diagnosed with Syncope and Palpitations and prescribed alprazolam 1 mg PO x one in the ED. 27 year old male, with past history significant for Migraine headaches, presented to the ED secondary to signs/symptoms similar to that of a panic attack, as well as chest tightness.  Seen and evaluated at bedside with mother present; NAD.  Patient relates episode of palpitations, sweating, dizziness, spotty vision with sensation of vision "closing in" then darkness with subsequent loss of consciousness approximately 3 weeks ago.  Wakens with generalized body tremor and residual headache.  Has had repeated occurrences of similar cluster of signs/symptoms, occurring twice daily at times, but with gradually worsening intensity.  No trauma related to LOC.  Reports that at times of onset, quickly getting to a chair and sitting down before passing out; usually wakens to find someone around him who had aided him as he lost consciousness.  Prior to the first episode involving LOC, patient had onset of palpitations with diaphoresis and anxiety - somewhat like a sense of doom; palpitations and anxiety subsided after paced the floor, back and forth.  Patient reports nausea without vomiting.  However, has been suffering with watery diarrhea since onset of above signs/symptoms.  Appreciable weight loss (usually weighs between 195 and 200 lbs, but is now ~ 169 lbs).  Has decreased appetite and has to force himself to take in nutrition and hydration.  Does not take any herbal supplement, or other supplement (including body-building supplement).  Takes a multivitamin and a probiotic only at home.  Also, patient described cloudiness of mentation; occurs after loss of consciousness, but mother notes that patient, without the constellation of signs/symptoms will have a change in mentation; stares into space without comprehending what is happening around him.    Smokes marijuana, but has not done so for the past  3 weeks (after the first episode described above).  Does not believe the marijuana could have been laced with any other product.    NB - Patient and mother requesting family to be contacted prior to any decisions (especially those requiring signature) since patient has episodes of cloudy mentation w/o understanding/recalling concurrent events.    Vital signs upon ED presentation as follows: BP = 130/79, HR = 73, RR = 16, T = 36.6 C (97.9 F), O2 Sat = 100% on RA.  Diagnosed with Syncope and Palpitations and prescribed alprazolam 1 mg PO x one in the ED.

## 2022-11-01 DIAGNOSIS — Z29.9 ENCOUNTER FOR PROPHYLACTIC MEASURES, UNSPECIFIED: ICD-10-CM

## 2022-11-01 DIAGNOSIS — G43.909 MIGRAINE, UNSPECIFIED, NOT INTRACTABLE, WITHOUT STATUS MIGRAINOSUS: ICD-10-CM

## 2022-11-01 DIAGNOSIS — G93.40 ENCEPHALOPATHY, UNSPECIFIED: ICD-10-CM

## 2022-11-01 DIAGNOSIS — Z98.890 OTHER SPECIFIED POSTPROCEDURAL STATES: Chronic | ICD-10-CM

## 2022-11-01 DIAGNOSIS — R63.4 ABNORMAL WEIGHT LOSS: ICD-10-CM

## 2022-11-01 DIAGNOSIS — E86.0 DEHYDRATION: ICD-10-CM

## 2022-11-01 DIAGNOSIS — R19.7 DIARRHEA, UNSPECIFIED: ICD-10-CM

## 2022-11-01 LAB
24R-OH-CALCIDIOL SERPL-MCNC: 30.4 NG/ML — SIGNIFICANT CHANGE UP (ref 30–80)
ANION GAP SERPL CALC-SCNC: 9 MMOL/L — SIGNIFICANT CHANGE UP (ref 7–14)
BASOPHILS # BLD AUTO: 0.03 K/UL — SIGNIFICANT CHANGE UP (ref 0–0.2)
BASOPHILS NFR BLD AUTO: 0.7 % — SIGNIFICANT CHANGE UP (ref 0–2)
BUN SERPL-MCNC: 12 MG/DL — SIGNIFICANT CHANGE UP (ref 7–23)
CALCIUM SERPL-MCNC: 9.2 MG/DL — SIGNIFICANT CHANGE UP (ref 8.4–10.5)
CHLORIDE SERPL-SCNC: 104 MMOL/L — SIGNIFICANT CHANGE UP (ref 98–107)
CO2 SERPL-SCNC: 26 MMOL/L — SIGNIFICANT CHANGE UP (ref 22–31)
CREAT SERPL-MCNC: 0.86 MG/DL — SIGNIFICANT CHANGE UP (ref 0.5–1.3)
EGFR: 122 ML/MIN/1.73M2 — SIGNIFICANT CHANGE UP
EOSINOPHIL # BLD AUTO: 0.13 K/UL — SIGNIFICANT CHANGE UP (ref 0–0.5)
EOSINOPHIL NFR BLD AUTO: 3.2 % — SIGNIFICANT CHANGE UP (ref 0–6)
GLUCOSE SERPL-MCNC: 82 MG/DL — SIGNIFICANT CHANGE UP (ref 70–99)
HCT VFR BLD CALC: 40.1 % — SIGNIFICANT CHANGE UP (ref 39–50)
HGB BLD-MCNC: 13.7 G/DL — SIGNIFICANT CHANGE UP (ref 13–17)
IANC: 1.64 K/UL — LOW (ref 1.8–7.4)
IMM GRANULOCYTES NFR BLD AUTO: 0.2 % — SIGNIFICANT CHANGE UP (ref 0–0.9)
LYMPHOCYTES # BLD AUTO: 1.87 K/UL — SIGNIFICANT CHANGE UP (ref 1–3.3)
LYMPHOCYTES # BLD AUTO: 46.1 % — HIGH (ref 13–44)
MAGNESIUM SERPL-MCNC: 1.7 MG/DL — SIGNIFICANT CHANGE UP (ref 1.6–2.6)
MCHC RBC-ENTMCNC: 27.2 PG — SIGNIFICANT CHANGE UP (ref 27–34)
MCHC RBC-ENTMCNC: 34.2 GM/DL — SIGNIFICANT CHANGE UP (ref 32–36)
MCV RBC AUTO: 79.7 FL — LOW (ref 80–100)
MONOCYTES # BLD AUTO: 0.38 K/UL — SIGNIFICANT CHANGE UP (ref 0–0.9)
MONOCYTES NFR BLD AUTO: 9.4 % — SIGNIFICANT CHANGE UP (ref 2–14)
NEUTROPHILS # BLD AUTO: 1.64 K/UL — LOW (ref 1.8–7.4)
NEUTROPHILS NFR BLD AUTO: 40.4 % — LOW (ref 43–77)
NRBC # BLD: 0 /100 WBCS — SIGNIFICANT CHANGE UP (ref 0–0)
NRBC # FLD: 0 K/UL — SIGNIFICANT CHANGE UP (ref 0–0)
PHOSPHATE SERPL-MCNC: 4.2 MG/DL — SIGNIFICANT CHANGE UP (ref 2.5–4.5)
PLATELET # BLD AUTO: 239 K/UL — SIGNIFICANT CHANGE UP (ref 150–400)
POTASSIUM SERPL-MCNC: 3.6 MMOL/L — SIGNIFICANT CHANGE UP (ref 3.5–5.3)
POTASSIUM SERPL-SCNC: 3.6 MMOL/L — SIGNIFICANT CHANGE UP (ref 3.5–5.3)
RBC # BLD: 5.03 M/UL — SIGNIFICANT CHANGE UP (ref 4.2–5.8)
RBC # FLD: 12.8 % — SIGNIFICANT CHANGE UP (ref 10.3–14.5)
SODIUM SERPL-SCNC: 139 MMOL/L — SIGNIFICANT CHANGE UP (ref 135–145)
WBC # BLD: 4.06 K/UL — SIGNIFICANT CHANGE UP (ref 3.8–10.5)
WBC # FLD AUTO: 4.06 K/UL — SIGNIFICANT CHANGE UP (ref 3.8–10.5)

## 2022-11-01 PROCEDURE — 70450 CT HEAD/BRAIN W/O DYE: CPT | Mod: 26

## 2022-11-01 PROCEDURE — 70551 MRI BRAIN STEM W/O DYE: CPT | Mod: 26

## 2022-11-01 PROCEDURE — 93306 TTE W/DOPPLER COMPLETE: CPT | Mod: 26

## 2022-11-01 RX ORDER — SIMETHICONE 80 MG/1
80 TABLET, CHEWABLE ORAL EVERY 8 HOURS
Refills: 0 | Status: DISCONTINUED | OUTPATIENT
Start: 2022-11-01 | End: 2022-11-05

## 2022-11-01 RX ORDER — L.ACIDOPH/B.ANIMALIS/B.LONGUM 15B CELL
1 CAPSULE ORAL
Qty: 0 | Refills: 0 | DISCHARGE

## 2022-11-01 RX ORDER — SODIUM CHLORIDE 9 MG/ML
1000 INJECTION, SOLUTION INTRAVENOUS
Refills: 0 | Status: DISCONTINUED | OUTPATIENT
Start: 2022-11-01 | End: 2022-11-05

## 2022-11-01 RX ORDER — ALPRAZOLAM 0.25 MG
0.5 TABLET ORAL EVERY 8 HOURS
Refills: 0 | Status: DISCONTINUED | OUTPATIENT
Start: 2022-11-01 | End: 2022-11-05

## 2022-11-01 RX ADMIN — SODIUM CHLORIDE 125 MILLILITER(S): 9 INJECTION, SOLUTION INTRAVENOUS at 01:21

## 2022-11-01 RX ADMIN — Medication 0.5 MILLIGRAM(S): at 19:31

## 2022-11-01 NOTE — PATIENT PROFILE ADULT - FALL HARM RISK - HARM RISK INTERVENTIONS

## 2022-11-01 NOTE — PATIENT PROFILE ADULT - VISION (WITH CORRECTIVE LENSES IF THE PATIENT USUALLY WEARS THEM):
Wound Dressings: a bandage Normal vision: sees adequately in most situations; can see medication labels, newsprint

## 2022-11-01 NOTE — CONSULT NOTE ADULT - ATTENDING COMMENTS
Arley   27 year old black male, with Migraine headaches, presented to the ED  with syncope.  found to be sinus dustin.    2-3 weeks of shakiness. no tongue bite, no incontinence.  + lightheaded  increase stress  recent 20lbs weight loss   CTH neg      Impression; synope   - vEEG ordered by me   - MRI brain  ordered by me   - check orthsotatics   - cardio recs appreciated   - TTE  - telemetry  - check FS, glucose control <180  - GI/DVT ppx  - Counseling on diet, exercise, and medication adherence was done  - Counseling on smoking cessation and alcohol consumption offered when appropriate.  - Pain assessed and judicious use of narcotics when appropriate was discussed.    - Stroke education given when appropriate.  - Importance of fall prevention discussed.   - Differential diagnosis and plan of care discussed with patient and/or family and primary team  - Thank you for allowing me to participate in the care of this patient. Call with questions.   Arnold Pacheco MD  Vascular Neurology  Office: [phone]

## 2022-11-01 NOTE — CONSULT NOTE ADULT - SUBJECTIVE AND OBJECTIVE BOX
HPI:  27 year old male, with past history significant for Migraine headaches, presented to the ED secondary to signs/symptoms similar to that of a panic attack, as well as chest tightness.  Seen and evaluated at bedside with mother present; NAD.  Patient relates episode of palpitations, sweating, dizziness, spotty vision with sensation of vision "closing in" then darkness with subsequent loss of consciousness approximately 3 weeks ago.  Wakens with generalized body tremor and residual headache.  Has had repeated occurrences of similar cluster of signs/symptoms, occurring twice daily at times, but with gradually worsening intensity.  No trauma related to LOC.  Reports that at times of onset, quickly getting to a chair and sitting down before passing out; usually wakens to find someone around him who had aided him as he lost consciousness.  Prior to the first episode involving LOC, patient had onset of palpitations with diaphoresis and anxiety - somewhat like a sense of doom; palpitations and anxiety subsided after paced the floor, back and forth.  Patient reports nausea without vomiting.  However, has been suffering with watery diarrhea since onset of above signs/symptoms.  Appreciable weight loss (usually weighs between 195 and 200 lbs, but is now ~ 169 lbs).  Has decreased appetite and has to force himself to take in nutrition and hydration.  Does not take any herbal supplement, or other supplement (including body-building supplement).  Takes a multivitamin and a probiotic only at home.  Also, patient described cloudiness of mentation; occurs after loss of consciousness, but mother notes that patient, without the constellation of signs/symptoms will have a change in mentation; stares into space without comprehending what is happening around him.    Smokes marijuana, but has not done so for the past  3 weeks (after the first episode described above).  Does not believe the marijuana could have been laced with any other product.    NB - Patient and mother requesting family to be contacted prior to any decisions (especially those requiring signature) since patient has episodes of cloudy mentation w/o understanding/recalling concurrent events.    Vital signs upon ED presentation as follows: BP = 130/79, HR = 73, RR = 16, T = 36.6 C (97.9 F), O2 Sat = 100% on RA.  Diagnosed with Syncope and Palpitations and prescribed alprazolam 1 mg PO x one in the ED. (31 Oct 2022 21:49)      ROS: A 10-system ROS was performed and is negative except for those items noted above and/or in the HPI.    PAST MEDICAL & SURGICAL HISTORY:  History of migraine headaches      History of surgery on upper extremity  ~ left - due to fracture        FAMILY HISTORY:  Family history of hypertension (Father, Mother, Grandparent)    Family history of diabetes mellitus (Grandparent)        SOCIAL HISTORY: SOCIAL HISTORY:     Marital Status: (  )   (  ) Single  (  )   (  )      Occupation:      Lives: (  ) alone  (  ) with children   (  ) with spouse  (  ) with parents  (  ) other     Illicit Drug Use: (  ) never used  (  ) other _____     Tobacco Use:  (  ) never smoked  (  ) former smoker  (  ) current smoker  (  ) pack year  (  ) last cigarette date     Alcohol Use:      Sexual History:        MEDICATIONS  Home Medications:  Multiple Vitamins oral tablet: 1 tab(s) orally once a day (01 Nov 2022 03:12)  Probiotic Formula oral capsule: 1 cap(s) orally once a day (01 Nov 2022 03:12)      MEDICATIONS  (STANDING):  lactated ringers. 1000 milliLiter(s) (125 mL/Hr) IV Continuous <Continuous>    MEDICATIONS  (PRN):  acetaminophen     Tablet .. 650 milliGRAM(s) Oral every 6 hours PRN Mild Pain (1 - 3)  ALPRAZolam 0.5 milliGRAM(s) Oral every 8 hours PRN Anxiety/Panic attack  melatonin 3 milliGRAM(s) Oral at bedtime PRN Insomnia  ondansetron Injectable 4 milliGRAM(s) IV Push every 8 hours PRN Nausea and/or Vomiting  simethicone 80 milliGRAM(s) Chew every 8 hours PRN Gas      ALLERGIES/INTOLERANCES:  Allergies  No Known Allergies    Intolerances      OBJECTIVE:  VITALS   Vital Signs Last 24 Hrs  T(C): 36.6 (01 Nov 2022 12:32), Max: 36.8 (31 Oct 2022 16:14)  T(F): 97.9 (01 Nov 2022 12:32), Max: 98.3 (31 Oct 2022 16:14)  HR: 61 (01 Nov 2022 12:32) (56 - 72)  BP: 110/63 (01 Nov 2022 12:32) (110/63 - 134/89)  BP(mean): --  RR: 17 (01 Nov 2022 12:32) (17 - 18)  SpO2: 100% (01 Nov 2022 12:32) (100% - 100%)    Parameters below as of 01 Nov 2022 12:32  Patient On (Oxygen Delivery Method): room air        PHYSICAL EXAM:  Neurological Exam:  MS: Awake, alert, oriented to person, place, month, year, president. Normal affect. Follows all commands.    Language: Speech is clear, fluent with good repetition & comprehension (able to name objects___)    CNs: PERRLA (R = 3mm, L = 3mm). VFF. EOMI no nystagmus, no diplopia. V1-3 intact to LT/pinprick, well developed masseter muscles b/l. No facial asymmetry b/l, Hearing grossly normal (rubbing fingers) b/l. Symmetric palate elevation in midline. Gag reflex deferred. Head turning & shoulder shrug intact b/l. Tongue midline, normal movements, no atrophy.    Fundoscopic: pale w/ sharp discs margins No vascular changes.      Motor: Normal muscle bulk & tone. No noticeable tremor or seizure.               Deltoid	Biceps	Triceps 	   R	5	5	5	5	 	  L	5	5	5	5			    	H-Flex	H-Ext	K-Flex	K-Ext	D-Flex	P-Flex  R	5	5	5	5	5	5		   L	5	5	5	5	5	5		     Sensation: Intact to LT/PP/Temp/Vibration/Position b/l throughout.     Cortical: Extinction on DSS (neglect): none    Reflexes:              Biceps(C5)       BR(C6)     Triceps(C7)               Patellar(L4)    Achilles(S1)    Plantar Resp  R	2	          2	             2		        2		    2		Down   L	2	          2	             2		        2		    2		Down     Coordination: intact rapid-alt movements. No dysmetria to FTN/HTS    Gait:  Normal Romberg. No postural instability. Normal stance and tandem gait.     LABORATORY:  CBC                       13.7   4.06  )-----------( 239      ( 01 Nov 2022 06:22 )             40.1     Chem 11-01    139  |  104  |  12  ----------------------------<  82  3.6   |  26  |  0.86    Ca    9.2      01 Nov 2022 06:22  Phos  4.2     11-01  Mg     1.70     11-01    TPro  8.0  /  Alb  5.2<H>  /  TBili  0.6  /  DBili  x   /  AST  20  /  ALT  19  /  AlkPhos  59  10-31    LFTs LIVER FUNCTIONS - ( 31 Oct 2022 15:09 )  Alb: 5.2 g/dL / Pro: 8.0 g/dL / ALK PHOS: 59 U/L / ALT: 19 U/L / AST: 20 U/L / GGT: x           Coagulopathy   Lipid Panel   A1c   Cardiac enzymes     U/A   CSF  Immunological  Other    STUDIES & IMAGING:  Studies (EKG, EEG, EMG, etc):     Radiology (XR, CT, MR, U/S, TTE/VIRGIL): HPI:  27 year old male, with past history significant for Migraine headaches, presented to the ED secondary to signs/symptoms similar to that of a panic attack, as well as chest tightness.  Seen and evaluated at bedside with mother present; NAD.  Patient relates episode of palpitations, sweating, dizziness, spotty vision with sensation of vision "closing in" then darkness with subsequent loss of consciousness approximately 3 weeks ago.  Wakens with generalized body tremor and residual headache. Also, patient described cloudiness of mentation; occurs after loss of consciousness;  Has had repeated occurrences of similar cluster of signs/symptoms, occurring twice daily at times, but with gradually worsening intensity.   Reports that at times of onset, quickly getting to a chair and sitting down before passing out; usually wakens to find someone around him who had aided him as he lost consciousness.  Prior to the first episode involving LOC, patient had onset of palpitations with diaphoresis and anxiety - somewhat like a sense of doom; palpitations and anxiety subsided after paced the floor, back and forth. Pt stares into space without comprehending what is happening around him. Patient reports nausea w/o vomiting.  Watery diarrhea since onset of above signs/symptoms.  Appreciable weight loss (usually weighs between 195 and 200 lbs, but is now ~ 169 lbs).  Has decreased appetite and has to force himself to take in nutrition and hydration.  No trauma related to LOC. Currently denies any numbness, tingling, focal weakness, changes in vision/hearing. Diagnosed with Syncope and Palpitations and prescribed alprazolam 1 mg PO x one in the ED.     Neurology consulted for above history. History confirmed with patient, as above. Currently denies any numbness, tingling, focal weakness, changes in vision/hearing.     PAST MEDICAL & SURGICAL HISTORY:  History of migraine headaches      History of surgery on upper extremity  ~ left - due to fracture        FAMILY HISTORY:  Family history of hypertension (Father, Mother, Grandparent)    Family history of diabetes mellitus (Grandparent)        SOCIAL HISTORY: SOCIAL HISTORY:     Marital Status: (  )   ( x ) Single  (  )   (  )      Occupation:      Lives: (  ) alone  (  ) with children   (  ) with spouse  ( x ) with parents  (  ) other     Illicit Drug Use: (  ) never used  (  ) other _____     Tobacco Use:  (  ) never smoked  (  ) former smoker  (  ) current smoker  (  ) pack year  (  ) last cigarette date     Alcohol Use:      Sexual History:        MEDICATIONS  Home Medications:  Multiple Vitamins oral tablet: 1 tab(s) orally once a day (01 Nov 2022 03:12)  Probiotic Formula oral capsule: 1 cap(s) orally once a day (01 Nov 2022 03:12)      MEDICATIONS  (STANDING):  lactated ringers. 1000 milliLiter(s) (125 mL/Hr) IV Continuous <Continuous>    MEDICATIONS  (PRN):  acetaminophen     Tablet .. 650 milliGRAM(s) Oral every 6 hours PRN Mild Pain (1 - 3)  ALPRAZolam 0.5 milliGRAM(s) Oral every 8 hours PRN Anxiety/Panic attack  melatonin 3 milliGRAM(s) Oral at bedtime PRN Insomnia  ondansetron Injectable 4 milliGRAM(s) IV Push every 8 hours PRN Nausea and/or Vomiting  simethicone 80 milliGRAM(s) Chew every 8 hours PRN Gas      ALLERGIES/INTOLERANCES:  Allergies  No Known Allergies    Intolerances      OBJECTIVE:  VITALS   Vital Signs Last 24 Hrs  T(C): 36.6 (01 Nov 2022 12:32), Max: 36.8 (31 Oct 2022 16:14)  T(F): 97.9 (01 Nov 2022 12:32), Max: 98.3 (31 Oct 2022 16:14)  HR: 61 (01 Nov 2022 12:32) (56 - 72)  BP: 110/63 (01 Nov 2022 12:32) (110/63 - 134/89)  BP(mean): --  RR: 17 (01 Nov 2022 12:32) (17 - 18)  SpO2: 100% (01 Nov 2022 12:32) (100% - 100%)    Parameters below as of 01 Nov 2022 12:32  Patient On (Oxygen Delivery Method): room air        PHYSICAL EXAM:  Neurological Exam:  MS: Awake, alert, oriented to person, place, month, year, president. Normal affect. Follows all commands.    Language: Speech is clear, fluent with good repetition & comprehension (able to name objects___)    CNs: PERRLA (R = 3mm, L = 3mm). VFF. EOMI no nystagmus, no diplopia. V1-3 intact to LT/pinprick, well developed masseter muscles b/l. No facial asymmetry b/l, Hearing grossly normal (rubbing fingers) b/l. Symmetric palate elevation in midline. Gag reflex deferred. Head turning & shoulder shrug intact b/l. Tongue midline, normal movements, no atrophy.    Fundoscopic: pale w/ sharp discs margins No vascular changes.      Motor: Normal muscle bulk & tone. No noticeable tremor or seizure.               Deltoid	Biceps	Triceps 	   R	5	5	5	5	 	  L	5	5	5	5			    	H-Flex	H-Ext	K-Flex	K-Ext	D-Flex	P-Flex  R	5	5	5	5	5	5		   L	5	5	5	5	5	5		     Sensation: Intact to LT/PP/Temp/Vibration/Position b/l throughout.     Cortical: Extinction on DSS (neglect): none    Reflexes:              Biceps(C5)       BR(C6)     Triceps(C7)               Patellar(L4)    Achilles(S1)    Plantar Resp  R	2	          2	             2		        2		    2		Down   L	2	          2	             2		        2		    2		Down     Coordination: intact rapid-alt movements. No dysmetria to FTN/HTS    Gait:  Normal Romberg. No postural instability. Normal stance and tandem gait.     LABORATORY:  CBC                       13.7   4.06  )-----------( 239      ( 01 Nov 2022 06:22 )             40.1     Chem 11-01    139  |  104  |  12  ----------------------------<  82  3.6   |  26  |  0.86    Ca    9.2      01 Nov 2022 06:22  Phos  4.2     11-01  Mg     1.70     11-01    TPro  8.0  /  Alb  5.2<H>  /  TBili  0.6  /  DBili  x   /  AST  20  /  ALT  19  /  AlkPhos  59  10-31    LFTs LIVER FUNCTIONS - ( 31 Oct 2022 15:09 )  Alb: 5.2 g/dL / Pro: 8.0 g/dL / ALK PHOS: 59 U/L / ALT: 19 U/L / AST: 20 U/L / GGT: x           Coagulopathy   Lipid Panel   A1c   Cardiac enzymes     U/A   CSF  Immunological  Other    STUDIES & IMAGING:  Studies (EKG, EEG, EMG, etc):     Radiology (XR, CT, MR, U/S, TTE/VIRGIL):  CT head:     FINDINGS:  VENTRICLES AND SULCI:  Normal.  INTRA-AXIAL:  No mass, blood or abnormal attenuation is seen.  EXTRA-AXIAL:  No mass or collection is seen.  VISUALIZED SINUSES:  Small left maxillary polyp or retention cyst  VISUALIZED MASTOIDS:  Clear.  CALVARIUM:  Normal.  MISCELLANEOUS:  None.    IMPRESSION:  Normal non-contrast CT of the brain.   HPI:  27 year old male, with past history significant for Migraine headaches, presented to the ED secondary to signs/symptoms similar to that of a panic attack, as well as chest tightness. Patient relates episode of palpitations, sweating, dizziness, spotty vision with sensation of vision "closing in" then darkness with subsequent loss of consciousness last thursday and friday.  Wakens with generalized body tremor and residual headache and describes cloudiness of mentation with associated watery diarrhea with episodes. Denies any tongue biting, urinary incontinence with episodes.  Denies any tongue biting, urinary incontinence with episodes but has an aura sometimes of "nervousness" in stomach with tachycardia/diaphoresis.     Has had repeated occurrences of similar cluster of signs/symptoms, occurring twice daily at times, starting 3 weeks ago, without the changes in vision/LOC.  Prior to the first episode involving LOC on thursday, patient had onset of palpitations with diaphoresis and anxiety - somewhat like a sense of doom; palpitations and anxiety subsided after paced the floor, back and forth. Per mother, pt also had episodes of staring into space without comprehending what is happening around him, possibly separate from above described episodes. Patient reports nausea w/o vomiting.  Appreciable weight loss (usually weighs between 195 and 200 lbs, but is now ~ 169 lbs). as he had decreased appetite and has to force himself to take in nutrition and hydration. Currently denies any numbness, tingling, focal weakness, changes in vision/hearing, recent trauma. Pt denies any hx of seizures including febrile seizures, FH of seizures and developmental issues. Pt smoked marijuana until 3 weeks ago and then stopped- denies any alcohol or other recreational drug use.     Diagnosed with Syncope and Palpitations and prescribed alprazolam 1 mg PO x one in the ED.   .     PAST MEDICAL & SURGICAL HISTORY:  History of migraine headaches      History of surgery on upper extremity  ~ left - due to fracture        FAMILY HISTORY:  Family history of hypertension (Father, Mother, Grandparent)    Family history of diabetes mellitus (Grandparent)        SOCIAL HISTORY: SOCIAL HISTORY:     Marital Status: (  )   ( x ) Single  (  )   (  )      Occupation:      Lives: (  ) alone  (  ) with children   (  ) with spouse  ( x ) with parents  (  ) other  Former Marijuana user.       MEDICATIONS  Home Medications:  Multiple Vitamins oral tablet: 1 tab(s) orally once a day (01 Nov 2022 03:12)  Probiotic Formula oral capsule: 1 cap(s) orally once a day (01 Nov 2022 03:12)      MEDICATIONS  (STANDING):  lactated ringers. 1000 milliLiter(s) (125 mL/Hr) IV Continuous <Continuous>    MEDICATIONS  (PRN):  acetaminophen     Tablet .. 650 milliGRAM(s) Oral every 6 hours PRN Mild Pain (1 - 3)  ALPRAZolam 0.5 milliGRAM(s) Oral every 8 hours PRN Anxiety/Panic attack  melatonin 3 milliGRAM(s) Oral at bedtime PRN Insomnia  ondansetron Injectable 4 milliGRAM(s) IV Push every 8 hours PRN Nausea and/or Vomiting  simethicone 80 milliGRAM(s) Chew every 8 hours PRN Gas      ALLERGIES/INTOLERANCES:  Allergies  No Known Allergies    Intolerances      OBJECTIVE:  VITALS   Vital Signs Last 24 Hrs  T(C): 36.6 (01 Nov 2022 12:32), Max: 36.8 (31 Oct 2022 16:14)  T(F): 97.9 (01 Nov 2022 12:32), Max: 98.3 (31 Oct 2022 16:14)  HR: 61 (01 Nov 2022 12:32) (56 - 72)  BP: 110/63 (01 Nov 2022 12:32) (110/63 - 134/89)  BP(mean): --  RR: 17 (01 Nov 2022 12:32) (17 - 18)  SpO2: 100% (01 Nov 2022 12:32) (100% - 100%)    Parameters below as of 01 Nov 2022 12:32  Patient On (Oxygen Delivery Method): room air        PHYSICAL EXAM:  Constitutional: male, appears stated age, in no apparent distress including pain  Head: Normocephalic & Atraumatic.  Respiratory: Breathing comfortably.  Extremities: No cyanosis, clubbing, or edema  Skin: no rash, no bruising     Neurological Exam:  MS: Awake, alert, oriented to person, place, month, year, president. Normal affect. Follows all commands.    Language: Speech is clear, fluent with good repetition & comprehension (able to name objects___)    CNs: PERRLA (R = 3mm, L = 3mm). VFF. EOMI no nystagmus, no diplopia. V1-3 intact to LT/pinprick, well developed masseter muscles b/l. No facial asymmetry b/l, Hearing grossly normal (rubbing fingers) b/l. Symmetric palate elevation in midline. Gag reflex deferred. Head turning & shoulder shrug intact b/l. Tongue midline, normal movements, no atrophy.    Fundoscopic: Deferred 2/2 COVID 19 pandemic.     Motor: Normal muscle bulk & tone. No noticeable tremor or seizure.               Deltoid	Biceps	Triceps 	   R	5	5	5	5	 	  L	5	5	5	5			    	H-Flex	H-Ext	K-Flex	K-Ext	D-Flex	P-Flex  R	5	5	5	5	5	5		   L	5	5	5	5	5	5		     Sensation: Intact to LT in all extremities.     Cortical: Extinction on DSS (neglect): none    Reflexes:              Biceps(C5)       BR(C6)     Triceps(C7)               Patellar(L4)    Achilles(S1)    Plantar Resp  R	2	          2	             2		        2		    2		Down   L	2	          2	             2		        2		    2		Down     Coordination: No dysmetria to FTN/HTS    Gait:  Normal Romberg. No postural instability. Normal stance and tandem gait.     LABORATORY:  CBC                       13.7   4.06  )-----------( 239      ( 01 Nov 2022 06:22 )             40.1     Chem 11-01    139  |  104  |  12  ----------------------------<  82  3.6   |  26  |  0.86    Ca    9.2      01 Nov 2022 06:22  Phos  4.2     11-01  Mg     1.70     11-01    TPro  8.0  /  Alb  5.2<H>  /  TBili  0.6  /  DBili  x   /  AST  20  /  ALT  19  /  AlkPhos  59  10-31    LFTs LIVER FUNCTIONS - ( 31 Oct 2022 15:09 )  Alb: 5.2 g/dL / Pro: 8.0 g/dL / ALK PHOS: 59 U/L / ALT: 19 U/L / AST: 20 U/L / GGT: x           Coagulopathy   Lipid Panel   A1c   Cardiac enzymes     U/A   CSF  Immunological  Other    STUDIES & IMAGING:  Studies (EKG, EEG, EMG, etc):     Radiology (XR, CT, MR, U/S, TTE/VIRGIL):  CT head:     FINDINGS:  VENTRICLES AND SULCI:  Normal.  INTRA-AXIAL:  No mass, blood or abnormal attenuation is seen.  EXTRA-AXIAL:  No mass or collection is seen.  VISUALIZED SINUSES:  Small left maxillary polyp or retention cyst  VISUALIZED MASTOIDS:  Clear.  CALVARIUM:  Normal.  MISCELLANEOUS:  None.    IMPRESSION:  Normal non-contrast CT of the brain.

## 2022-11-01 NOTE — CONSULT NOTE ADULT - SUBJECTIVE AND OBJECTIVE BOX
Cardiovascular Disease Initial Evaluation    CHIEF COMPLAINT:  Syncope, Palpitations.     HISTORY OF PRESENT ILLNESS:  27 year old male, with past history significant for Migraine headaches, presented to the ED secondary to signs/symptoms similar to that of a panic attack, as well as chest tightness.  Patient relates episode of palpitations, sweating, dizziness, spotty vision with sensation of vision "closing in" then darkness with subsequent loss of consciousness approximately 3 weeks ago.  Wakens with generalized body tremor and residual headache.  Has had repeated occurrences of similar cluster of signs/symptoms, occurring twice daily at times, but with gradually worsening intensity.  No trauma related to LOC.  Reports that at times of onset, quickly getting to a chair and sitting down before passing out; usually wakens to find someone around him who had aided him as he lost consciousness.  Prior to the first episode involving LOC, patient had onset of palpitations with diaphoresis and anxiety - somewhat like a sense of doom; palpitations and anxiety subsided after paced the floor, back and forth.  Patient reports nausea without vomiting.  However, has been suffering with watery diarrhea since onset of above signs/symptoms.  Appreciable weight loss (usually weighs between 195 and 200 lbs, but is now ~ 169 lbs).  Has decreased appetite and has to force himself to take in nutrition and hydration.  Does not take any herbal supplement, or other supplement (including body-building supplement).  Takes a multivitamin and a probiotic only at home.  Also, patient described cloudiness of mentation; occurs after loss of consciousness, but mother notes that patient, without the constellation of signs/symptoms will have a change in mentation; stares into space without comprehending what is happening around him.    Smokes marijuana, but has not done so for the past  3 weeks (after the first episode described above).  Does not believe the marijuana could have been laced with any other product.      Allergies    No Known Allergies    Intolerances    	    MEDICATIONS:        acetaminophen     Tablet .. 650 milliGRAM(s) Oral every 6 hours PRN  ALPRAZolam 0.5 milliGRAM(s) Oral every 8 hours PRN  melatonin 3 milliGRAM(s) Oral at bedtime PRN  ondansetron Injectable 4 milliGRAM(s) IV Push every 8 hours PRN    simethicone 80 milliGRAM(s) Chew every 8 hours PRN      lactated ringers. 1000 milliLiter(s) IV Continuous <Continuous>      PAST MEDICAL & SURGICAL HISTORY:  History of migraine headaches      History of surgery on upper extremity  ~ left - due to fracture          FAMILY HISTORY:  Family history of hypertension (Father, Mother, Grandparent)    Family history of diabetes mellitus (Grandparent)        SOCIAL HISTORY:    The patient is a nonsmoker       REVIEW OF SYSTEMS:  See HPI, otherwise complete 14 point review of systems negative    [x ] All others negative	  [ ] Unable to obtain    PHYSICAL EXAM:  T(C): 36.6 (11-01-22 @ 09:06), Max: 36.8 (10-31-22 @ 16:14)  HR: 71 (11-01-22 @ 09:06) (56 - 73)  BP: 121/79 (11-01-22 @ 09:06) (116/70 - 134/89)  RR: 17 (11-01-22 @ 09:06) (16 - 18)  SpO2: 100% (11-01-22 @ 09:06) (100% - 100%)  Wt(kg): --  I&O's Summary    31 Oct 2022 07:01  -  01 Nov 2022 07:00  --------------------------------------------------------  IN: 825 mL / OUT: 200 mL / NET: 625 mL        Appearance: No Acute Distress; resting comfortably  HEENT:  Normal oral mucosa, PERRL, EOMI	  Cardiovascular: Normal S1 S2, No JVD, No murmurs/rubs/gallops  Respiratory: Normal respiratory effort; Lungs clear to auscultation bilaterally  Gastrointestinal:  Soft, Non-tender, + BS	  Skin: No rashes, No ecchymoses, No cyanosis	  Neurologic: Non-focal; no weakness  Extremities: No clubbing, cyanosis or edema  Vascular: Peripheral pulses palpable 2+ bilaterally  Psychiatry: A & O x 3, Mood & affect appropriate    Laboratory Data:	 	    CBC Full  -  ( 01 Nov 2022 06:22 )  WBC Count : 4.06 K/uL  Hemoglobin : 13.7 g/dL  Hematocrit : 40.1 %  Platelet Count - Automated : 239 K/uL  Mean Cell Volume : 79.7 fL  Mean Cell Hemoglobin : 27.2 pg  Mean Cell Hemoglobin Concentration : 34.2 gm/dL  Auto Neutrophil # : 1.64 K/uL  Auto Lymphocyte # : 1.87 K/uL  Auto Monocyte # : 0.38 K/uL  Auto Eosinophil # : 0.13 K/uL  Auto Basophil # : 0.03 K/uL  Auto Neutrophil % : 40.4 %  Auto Lymphocyte % : 46.1 %  Auto Monocyte % : 9.4 %  Auto Eosinophil % : 3.2 %  Auto Basophil % : 0.7 %    11-01    139  |  104  |  12  ----------------------------<  82  3.6   |  26  |  0.86  10-31    138  |  101  |  14  ----------------------------<  91  4.1   |  25  |  0.84    Ca    9.2      01 Nov 2022 06:22  Ca    9.9      31 Oct 2022 15:09  Phos  4.2     11-01  Mg     1.70     11-01    TPro  8.0  /  Alb  5.2<H>  /  TBili  0.6  /  DBili  x   /  AST  20  /  ALT  19  /  AlkPhos  59  10-31      proBNP:   Lipid Profile:   HgA1c:   TSH: Thyroid Stimulating Hormone, Serum: 1.59 uIU/mL (10-31 @ 15:09)        CARDIAC MARKERS:            Interpretation of Telemetry: Sinus rhythm, Episode of bradycardia overnight. 	    ECG:  	  RADIOLOGY:  OTHER: 	    PREVIOUS DIAGNOSTIC TESTING:    [ ] Echocardiogram:  [ ] Catheterization:  [ ] Stress Test:  	    Assessment:    27 year old male, with past history significant for Migraine headaches, presented to the ED with syncope and palpitation    Plan of Care:    #Syncope and collapse  - Rule out cardiogenic etiology  - EKG shows  - Tele with no significant events . Continue to monitor  - Obtain Echo  - Obtain orthostatics. Pt with significant weight loss   - Recommend neuro input for possible seizure activity.         72 minutes spent on total encounter; more than 50% of the visit was spent counseling and/or coordinating care by the attending physician.   	  Lul Chappell D.O.   Cardiovascular Diseases  [phone]     Cardiovascular Disease Initial Evaluation    CHIEF COMPLAINT:  Syncope, Palpitations.     HISTORY OF PRESENT ILLNESS:  27 year old male, with past history significant for Migraine headaches, presented to the ED secondary to signs/symptoms similar to that of a panic attack, as well as chest tightness.  Patient relates episode of palpitations, sweating, dizziness, spotty vision with sensation of vision "closing in" then darkness with subsequent loss of consciousness approximately 3 weeks ago.  Wakens with generalized body tremor and residual headache.  Has had repeated occurrences of similar cluster of signs/symptoms, occurring twice daily at times, but with gradually worsening intensity.  No trauma related to LOC.  Reports that at times of onset, quickly getting to a chair and sitting down before passing out; usually wakens to find someone around him who had aided him as he lost consciousness.  Prior to the first episode involving LOC, patient had onset of palpitations with diaphoresis and anxiety - somewhat like a sense of doom; palpitations and anxiety subsided after paced the floor, back and forth.  Patient reports nausea without vomiting.  However, has been suffering with watery diarrhea since onset of above signs/symptoms.  Appreciable weight loss (usually weighs between 195 and 200 lbs, but is now ~ 169 lbs).  Has decreased appetite and has to force himself to take in nutrition and hydration.  Does not take any herbal supplement, or other supplement (including body-building supplement).  Takes a multivitamin and a probiotic only at home.  Also, patient described cloudiness of mentation; occurs after loss of consciousness, but mother notes that patient, without the constellation of signs/symptoms will have a change in mentation; stares into space without comprehending what is happening around him.    Smokes marijuana, but has not done so for the past  3 weeks (after the first episode described above).  Does not believe the marijuana could have been laced with any other product.    Pt has no family history of sudden cardiac death. Denies recent travel. Pt admits to having alot of stress in his life. Pt states that he has been unemployed since May and is having a hard time with his finances.       Allergies    No Known Allergies    Intolerances    	    MEDICATIONS:        acetaminophen     Tablet .. 650 milliGRAM(s) Oral every 6 hours PRN  ALPRAZolam 0.5 milliGRAM(s) Oral every 8 hours PRN  melatonin 3 milliGRAM(s) Oral at bedtime PRN  ondansetron Injectable 4 milliGRAM(s) IV Push every 8 hours PRN    simethicone 80 milliGRAM(s) Chew every 8 hours PRN      lactated ringers. 1000 milliLiter(s) IV Continuous <Continuous>      PAST MEDICAL & SURGICAL HISTORY:  History of migraine headaches      History of surgery on upper extremity  ~ left - due to fracture          FAMILY HISTORY:  Family history of hypertension (Father, Mother, Grandparent)    Family history of diabetes mellitus (Grandparent)        SOCIAL HISTORY:    The patient is a nonsmoker       REVIEW OF SYSTEMS:  See HPI, otherwise complete 14 point review of systems negative    [x ] All others negative	  [ ] Unable to obtain    PHYSICAL EXAM:  T(C): 36.6 (11-01-22 @ 09:06), Max: 36.8 (10-31-22 @ 16:14)  HR: 71 (11-01-22 @ 09:06) (56 - 73)  BP: 121/79 (11-01-22 @ 09:06) (116/70 - 134/89)  RR: 17 (11-01-22 @ 09:06) (16 - 18)  SpO2: 100% (11-01-22 @ 09:06) (100% - 100%)  Wt(kg): --  I&O's Summary    31 Oct 2022 07:01  -  01 Nov 2022 07:00  --------------------------------------------------------  IN: 825 mL / OUT: 200 mL / NET: 625 mL        Appearance: No Acute Distress; resting comfortably  HEENT:  Normal oral mucosa, PERRL, EOMI	  Cardiovascular: Normal S1 S2, No JVD, No murmurs/rubs/gallops  Respiratory: Normal respiratory effort; Lungs clear to auscultation bilaterally  Gastrointestinal:  Soft, Non-tender, + BS	  Skin: No rashes, No ecchymoses, No cyanosis	  Neurologic: Non-focal; no weakness  Extremities: No clubbing, cyanosis or edema  Vascular: Peripheral pulses palpable 2+ bilaterally  Psychiatry: A & O x 3, Mood & affect appropriate    Laboratory Data:	 	    CBC Full  -  ( 01 Nov 2022 06:22 )  WBC Count : 4.06 K/uL  Hemoglobin : 13.7 g/dL  Hematocrit : 40.1 %  Platelet Count - Automated : 239 K/uL  Mean Cell Volume : 79.7 fL  Mean Cell Hemoglobin : 27.2 pg  Mean Cell Hemoglobin Concentration : 34.2 gm/dL  Auto Neutrophil # : 1.64 K/uL  Auto Lymphocyte # : 1.87 K/uL  Auto Monocyte # : 0.38 K/uL  Auto Eosinophil # : 0.13 K/uL  Auto Basophil # : 0.03 K/uL  Auto Neutrophil % : 40.4 %  Auto Lymphocyte % : 46.1 %  Auto Monocyte % : 9.4 %  Auto Eosinophil % : 3.2 %  Auto Basophil % : 0.7 %    11-01    139  |  104  |  12  ----------------------------<  82  3.6   |  26  |  0.86  10-31    138  |  101  |  14  ----------------------------<  91  4.1   |  25  |  0.84    Ca    9.2      01 Nov 2022 06:22  Ca    9.9      31 Oct 2022 15:09  Phos  4.2     11-01  Mg     1.70     11-01    TPro  8.0  /  Alb  5.2<H>  /  TBili  0.6  /  DBili  x   /  AST  20  /  ALT  19  /  AlkPhos  59  10-31      proBNP:   Lipid Profile:   HgA1c:   TSH: Thyroid Stimulating Hormone, Serum: 1.59 uIU/mL (10-31 @ 15:09)        CARDIAC MARKERS:            Interpretation of Telemetry: Sinus rhythm, Episode of bradycardia overnight. 	    ECG:  	  RADIOLOGY:  OTHER: 	    PREVIOUS DIAGNOSTIC TESTING:    [ ] Echocardiogram:  [ ] Catheterization:  [ ] Stress Test:  	    Assessment:    27 year old male, with past history significant for Migraine headaches, presented to the ED with syncope and palpitation    Plan of Care:    #Syncope and collapse  - Rule out cardiogenic etiology  - EKG shows sinus bradycardia with sinus arrythmia.  - Tele with no significant events . Continue to monitor  - Obtain Echo  - Obtain orthostatics. Pt with significant weight loss   - Recommend neuro input for possible seizure activity.     #ACP (advance care planning)-  Advanced care planning was discussed with the patient.  Risks, benefits and alternatives of medical treatment and procedures were discussed in detail and all questions were answered. 30 minutes spent addressing advance care plans.        72 minutes spent on total encounter; more than 50% of the visit was spent counseling and/or coordinating care by the attending physician.   	  Lul Chappell D.O.   Cardiovascular Diseases  [phone]

## 2022-11-01 NOTE — CONSULT NOTE ADULT - ASSESSMENT
27 year old male, with past history significant for Migraine headaches, presented to the ED secondary to signs/symptoms similar to that of a panic attack, as well as chest tightness.  Seen and evaluated at bedside with mother present; NAD.  Patient relates episode of palpitations, sweating, dizziness, spotty vision with sensation of vision "closing in" then darkness with subsequent loss of consciousness approximately 3 weeks ago.  Wakens with generalized body tremor and residual headache. Also, patient described cloudiness of mentation; occurs after loss of consciousness;  Has had repeated occurrences of similar cluster of signs/symptoms, occurring twice daily at times, but with gradually worsening intensity.   Reports that at times of onset, quickly getting to a chair and sitting down before passing out; usually wakens to find someone around him who had aided him as he lost consciousness.  Prior to the first episode involving LOC, patient had onset of palpitations with diaphoresis and anxiety - somewhat like a sense of doom; palpitations and anxiety subsided after paced the floor, back and forth. Pt stares into space without comprehending what is happening around him. Patient reports nausea w/o vomiting.  Watery diarrhea since onset of above signs/symptoms.  Appreciable weight loss (usually weighs between 195 and 200 lbs, but is now ~ 169 lbs).  Has decreased appetite and has to force himself to take in nutrition and hydration.  No trauma related to LOC. Currently denies any numbness, tingling, focal weakness, changes in vision/hearing. Diagnosed with Syncope and Palpitations and prescribed alprazolam 1 mg PO x one in the ED.       Neurology consulted for above history. History confirmed with patient, as above. Currently denies any numbness, tingling, focal weakness, changes in vision/hearing.      27 year old male, with past history significant for Migraine headaches, presented to the ED secondary to signs/symptoms similar to that of a panic attack, as well as chest tightness. Patient relates episode of palpitations, sweating, dizziness, spotty vision with sensation of vision "closing in" then darkness with subsequent loss of consciousness last thursday and friday.  Wakens with generalized body tremor and residual headache and describes cloudiness of mentation with associated watery diarrhea with episodes. Denies any tongue biting, urinary incontinence with episodes but has an aura sometimes of "nervousness" in stomach with tachycardia/diaphoresis.     Has had repeated occurrences of similar cluster of signs/symptoms, occurring twice daily at times, starting 3 weeks ago, without the changes in vision/LOC.  Prior to the first episode involving LOC on thursday, patient had onset of palpitations with diaphoresis and anxiety - somewhat like a sense of doom; palpitations and anxiety subsided after paced the floor, back and forth. Per mother, pt also had episodes of staring into space without comprehending what is happening around him, possibly separate from above described episodes. Patient reports nausea w/o vomiting.  Appreciable weight loss (usually weighs between 195 and 200 lbs, but is now ~ 169 lbs). as he had decreased appetite and has to force himself to take in nutrition and hydration. Currently denies any numbness, tingling, focal weakness, changes in vision/hearing, recent trauma. Pt denies any hx of seizures including febrile seizures, FH of seizures and developmental issues. Pt smoked marijuana until 3 weeks ago and then stopped- denies any alcohol or other recreational drug use.     Diagnosed with Syncope and Palpitations and prescribed alprazolam 1 mg PO x one in the ED.     No acute changes on CT head. Neuro exam nonfocal.    Impression: Episodes of panic like symptoms with associated changes in vision/LOC, as well episodes of possible staring spells possibly 2/2 seizure like activity (possibly temporal lobe etiology) vs. cardiac etiology vs. other etiology.     Recommendations:  -24 Hour EEG  -AED recommendations: None at this time  -Administer  2 mg IV Ativan or 5 mg IV valium PRN STAT for seizure activity or GTC > 3 minutes or significant derangement of vital signs.  -Administer 1500 mg IV Keppra over 15 minutes for seizures refractory to ativan/valium.  -Toxic/metabolic/infectious work up per primary team- CBC, CMP, urine toxicology, UA, urine cx, blood cx, alcohol level, CK, CPK, lactate level  -May consider cardiac work up    Miscellaneous:  [] Q4H neurological checks and vital signs  [] Seizure, fall and aspiration precautions. Avoid sleep deprivation.  -If pt has a convulsion, please document accurately the lenght of episode and specifically what the pt was doing paying attention to eye blinking vs. closure, gaze deviation, shaking of extremities, tongue biting, urinary incontinence, any derangements of vital signs  -Advise pt not to drive, operate heavy machinery, avoid heights, pools, bathtubs, locked doors.     To be discussed with neurology attending and will be formally staffed by attending during morning rounds. Recommendations will be finalized once signed by attending.           27 year old male, with past history significant for Migraine headaches, presented to the ED secondary to signs/symptoms similar to that of a panic attack, as well as chest tightness. Patient relates episode of palpitations, sweating, dizziness, spotty vision with sensation of vision "closing in" then darkness with subsequent loss of consciousness last thursday and friday.  Wakens with generalized body tremor and residual headache and describes cloudiness of mentation with associated watery diarrhea with episodes. Denies any tongue biting, urinary incontinence with episodes but has an aura sometimes of "nervousness" in stomach with tachycardia/diaphoresis.     Has had repeated occurrences of similar cluster of signs/symptoms, occurring twice daily at times, starting 3 weeks ago, without the changes in vision/LOC.  Prior to the first episode involving LOC on thursday, patient had onset of palpitations with diaphoresis and anxiety - somewhat like a sense of doom; palpitations and anxiety subsided after paced the floor, back and forth. Per mother, pt also had episodes of staring into space without comprehending what is happening around him, possibly separate from above described episodes. Patient reports nausea w/o vomiting.  Appreciable weight loss (usually weighs between 195 and 200 lbs, but is now ~ 169 lbs). as he had decreased appetite and has to force himself to take in nutrition and hydration. Currently denies any numbness, tingling, focal weakness, changes in vision/hearing, recent trauma. Pt denies any hx of seizures including febrile seizures, FH of seizures and developmental issues. Pt smoked marijuana until 3 weeks ago and then stopped- denies any alcohol or other recreational drug use.     Diagnosed with Syncope and Palpitations and prescribed alprazolam 1 mg PO x one in the ED.     No acute changes on CT head. Neuro exam nonfocal.    Impression: Episodes of panic like symptoms with associated changes in vision/LOC, as well episodes of possible staring spells possibly 2/2 seizure like activity (possibly temporal lobe etiology) vs. cardiac etiology vs. other etiology.     Recommendations:  -24 Hour EEG  -AED recommendations: None at this time  -Administer  2 mg IV Ativan or 5 mg IV valium PRN STAT for seizure activity or GTC > 3 minutes or significant derangement of vital signs.  -Administer 1500 mg IV Keppra over 15 minutes for seizures refractory to ativan/valium.  -Toxic/metabolic/infectious work up per primary team- CBC, CMP, urine toxicology, UA, urine cx, blood cx, alcohol level, CK, CPK, lactate level  -F/U with Cardiology, may possibly consider Psychiatry    Miscellaneous:  [] Q4H neurological checks and vital signs  [] Seizure, fall and aspiration precautions. Avoid sleep deprivation.  -If pt has a convulsion, please document accurately the lenght of episode and specifically what the pt was doing paying attention to eye blinking vs. closure, gaze deviation, shaking of extremities, tongue biting, urinary incontinence, any derangements of vital signs  -Advise pt not to drive, operate heavy machinery, avoid heights, pools, bathtubs, locked doors.     To be discussed with neurology attending and will be formally staffed by attending during morning rounds. Recommendations will be finalized once signed by attending.

## 2022-11-02 LAB
ANION GAP SERPL CALC-SCNC: 9 MMOL/L — SIGNIFICANT CHANGE UP (ref 7–14)
BUN SERPL-MCNC: 12 MG/DL — SIGNIFICANT CHANGE UP (ref 7–23)
CALCIUM SERPL-MCNC: 9.4 MG/DL — SIGNIFICANT CHANGE UP (ref 8.4–10.5)
CGA FLD-MCNC: 28.4 NG/ML — SIGNIFICANT CHANGE UP (ref 0–101.8)
CHLORIDE SERPL-SCNC: 106 MMOL/L — SIGNIFICANT CHANGE UP (ref 98–107)
CO2 SERPL-SCNC: 25 MMOL/L — SIGNIFICANT CHANGE UP (ref 22–31)
CREAT SERPL-MCNC: 0.9 MG/DL — SIGNIFICANT CHANGE UP (ref 0.5–1.3)
EGFR: 121 ML/MIN/1.73M2 — SIGNIFICANT CHANGE UP
GLUCOSE SERPL-MCNC: 95 MG/DL — SIGNIFICANT CHANGE UP (ref 70–99)
HCT VFR BLD CALC: 41.4 % — SIGNIFICANT CHANGE UP (ref 39–50)
HGB BLD-MCNC: 14.2 G/DL — SIGNIFICANT CHANGE UP (ref 13–17)
MAGNESIUM SERPL-MCNC: 1.8 MG/DL — SIGNIFICANT CHANGE UP (ref 1.6–2.6)
MCHC RBC-ENTMCNC: 27.8 PG — SIGNIFICANT CHANGE UP (ref 27–34)
MCHC RBC-ENTMCNC: 34.3 GM/DL — SIGNIFICANT CHANGE UP (ref 32–36)
MCV RBC AUTO: 81 FL — SIGNIFICANT CHANGE UP (ref 80–100)
NRBC # BLD: 0 /100 WBCS — SIGNIFICANT CHANGE UP (ref 0–0)
NRBC # FLD: 0 K/UL — SIGNIFICANT CHANGE UP (ref 0–0)
PHOSPHATE SERPL-MCNC: 3.6 MG/DL — SIGNIFICANT CHANGE UP (ref 2.5–4.5)
PLATELET # BLD AUTO: 262 K/UL — SIGNIFICANT CHANGE UP (ref 150–400)
POTASSIUM SERPL-MCNC: 3.9 MMOL/L — SIGNIFICANT CHANGE UP (ref 3.5–5.3)
POTASSIUM SERPL-SCNC: 3.9 MMOL/L — SIGNIFICANT CHANGE UP (ref 3.5–5.3)
RBC # BLD: 5.11 M/UL — SIGNIFICANT CHANGE UP (ref 4.2–5.8)
RBC # FLD: 12.9 % — SIGNIFICANT CHANGE UP (ref 10.3–14.5)
SODIUM SERPL-SCNC: 140 MMOL/L — SIGNIFICANT CHANGE UP (ref 135–145)
WBC # BLD: 4.33 K/UL — SIGNIFICANT CHANGE UP (ref 3.8–10.5)
WBC # FLD AUTO: 4.33 K/UL — SIGNIFICANT CHANGE UP (ref 3.8–10.5)

## 2022-11-02 RX ORDER — SODIUM CHLORIDE 9 MG/ML
1000 INJECTION INTRAMUSCULAR; INTRAVENOUS; SUBCUTANEOUS
Refills: 0 | Status: DISCONTINUED | OUTPATIENT
Start: 2022-11-02 | End: 2022-11-05

## 2022-11-02 RX ADMIN — SODIUM CHLORIDE 100 MILLILITER(S): 9 INJECTION INTRAMUSCULAR; INTRAVENOUS; SUBCUTANEOUS at 16:26

## 2022-11-03 LAB
ANION GAP SERPL CALC-SCNC: 8 MMOL/L — SIGNIFICANT CHANGE UP (ref 7–14)
BUN SERPL-MCNC: 11 MG/DL — SIGNIFICANT CHANGE UP (ref 7–23)
CALCIUM SERPL-MCNC: 9.4 MG/DL — SIGNIFICANT CHANGE UP (ref 8.4–10.5)
CHLORIDE SERPL-SCNC: 102 MMOL/L — SIGNIFICANT CHANGE UP (ref 98–107)
CO2 SERPL-SCNC: 28 MMOL/L — SIGNIFICANT CHANGE UP (ref 22–31)
CREAT SERPL-MCNC: 0.83 MG/DL — SIGNIFICANT CHANGE UP (ref 0.5–1.3)
CRP SERPL HS-MCNC: <0.2 MG/L — SIGNIFICANT CHANGE UP
EGFR: 124 ML/MIN/1.73M2 — SIGNIFICANT CHANGE UP
ERYTHROCYTE [SEDIMENTATION RATE] IN BLOOD: 2 MM/HR — SIGNIFICANT CHANGE UP (ref 1–15)
GLUCOSE SERPL-MCNC: 80 MG/DL — SIGNIFICANT CHANGE UP (ref 70–99)
HCT VFR BLD CALC: 45.3 % — SIGNIFICANT CHANGE UP (ref 39–50)
HGB BLD-MCNC: 15.5 G/DL — SIGNIFICANT CHANGE UP (ref 13–17)
MAGNESIUM SERPL-MCNC: 2 MG/DL — SIGNIFICANT CHANGE UP (ref 1.6–2.6)
MCHC RBC-ENTMCNC: 27.5 PG — SIGNIFICANT CHANGE UP (ref 27–34)
MCHC RBC-ENTMCNC: 34.2 GM/DL — SIGNIFICANT CHANGE UP (ref 32–36)
MCV RBC AUTO: 80.5 FL — SIGNIFICANT CHANGE UP (ref 80–100)
NRBC # BLD: 0 /100 WBCS — SIGNIFICANT CHANGE UP (ref 0–0)
NRBC # FLD: 0 K/UL — SIGNIFICANT CHANGE UP (ref 0–0)
PCP SPEC-MCNC: SIGNIFICANT CHANGE UP
PHOSPHATE SERPL-MCNC: 3.7 MG/DL — SIGNIFICANT CHANGE UP (ref 2.5–4.5)
PLATELET # BLD AUTO: 273 K/UL — SIGNIFICANT CHANGE UP (ref 150–400)
POTASSIUM SERPL-MCNC: 3.8 MMOL/L — SIGNIFICANT CHANGE UP (ref 3.5–5.3)
POTASSIUM SERPL-SCNC: 3.8 MMOL/L — SIGNIFICANT CHANGE UP (ref 3.5–5.3)
RBC # BLD: 5.63 M/UL — SIGNIFICANT CHANGE UP (ref 4.2–5.8)
RBC # FLD: 12.9 % — SIGNIFICANT CHANGE UP (ref 10.3–14.5)
SODIUM SERPL-SCNC: 138 MMOL/L — SIGNIFICANT CHANGE UP (ref 135–145)
WBC # BLD: 4.48 K/UL — SIGNIFICANT CHANGE UP (ref 3.8–10.5)
WBC # FLD AUTO: 4.48 K/UL — SIGNIFICANT CHANGE UP (ref 3.8–10.5)

## 2022-11-03 PROCEDURE — 74177 CT ABD & PELVIS W/CONTRAST: CPT | Mod: 26

## 2022-11-03 PROCEDURE — 71260 CT THORAX DX C+: CPT | Mod: 26

## 2022-11-03 NOTE — PROVIDER CONTACT NOTE (OTHER) - SITUATION
Patient refused IV fluids. EEG not done today
Patient refused IV fluids. He reported unable to fall asleep due to IV pump "keeps on beeping"

## 2022-11-03 NOTE — PROVIDER CONTACT NOTE (OTHER) - ASSESSMENT
Patient alert and oriented x4, vital signs stable. IV site no swelling or redness, flushes well with blood return. Patient denies pain on IV site. Patient ordered IV fluids which he is still refusing.
Patient alert and oriented x4, vital signs stable. IV site no swelling or redness, flushes well with blood return. Patient denies pain on IV site. Patient on IV fluids which he is now refusing.

## 2022-11-04 LAB
ANION GAP SERPL CALC-SCNC: 12 MMOL/L — SIGNIFICANT CHANGE UP (ref 7–14)
BUN SERPL-MCNC: 10 MG/DL — SIGNIFICANT CHANGE UP (ref 7–23)
CALCIUM SERPL-MCNC: 9.3 MG/DL — SIGNIFICANT CHANGE UP (ref 8.4–10.5)
CHLORIDE SERPL-SCNC: 104 MMOL/L — SIGNIFICANT CHANGE UP (ref 98–107)
CO2 SERPL-SCNC: 24 MMOL/L — SIGNIFICANT CHANGE UP (ref 22–31)
CREAT SERPL-MCNC: 0.86 MG/DL — SIGNIFICANT CHANGE UP (ref 0.5–1.3)
CULTURE RESULTS: SIGNIFICANT CHANGE UP
EGFR: 122 ML/MIN/1.73M2 — SIGNIFICANT CHANGE UP
GLUCOSE SERPL-MCNC: 82 MG/DL — SIGNIFICANT CHANGE UP (ref 70–99)
HCT VFR BLD CALC: 40.7 % — SIGNIFICANT CHANGE UP (ref 39–50)
HGB BLD-MCNC: 13.7 G/DL — SIGNIFICANT CHANGE UP (ref 13–17)
MAGNESIUM SERPL-MCNC: 1.8 MG/DL — SIGNIFICANT CHANGE UP (ref 1.6–2.6)
MCHC RBC-ENTMCNC: 27.2 PG — SIGNIFICANT CHANGE UP (ref 27–34)
MCHC RBC-ENTMCNC: 33.7 GM/DL — SIGNIFICANT CHANGE UP (ref 32–36)
MCV RBC AUTO: 80.9 FL — SIGNIFICANT CHANGE UP (ref 80–100)
NRBC # BLD: 0 /100 WBCS — SIGNIFICANT CHANGE UP (ref 0–0)
NRBC # FLD: 0 K/UL — SIGNIFICANT CHANGE UP (ref 0–0)
PHOSPHATE SERPL-MCNC: 4.1 MG/DL — SIGNIFICANT CHANGE UP (ref 2.5–4.5)
PLATELET # BLD AUTO: 254 K/UL — SIGNIFICANT CHANGE UP (ref 150–400)
POTASSIUM SERPL-MCNC: 4.3 MMOL/L — SIGNIFICANT CHANGE UP (ref 3.5–5.3)
POTASSIUM SERPL-SCNC: 4.3 MMOL/L — SIGNIFICANT CHANGE UP (ref 3.5–5.3)
RBC # BLD: 5.03 M/UL — SIGNIFICANT CHANGE UP (ref 4.2–5.8)
RBC # FLD: 13 % — SIGNIFICANT CHANGE UP (ref 10.3–14.5)
SODIUM SERPL-SCNC: 140 MMOL/L — SIGNIFICANT CHANGE UP (ref 135–145)
SPECIMEN SOURCE: SIGNIFICANT CHANGE UP
WBC # BLD: 4.14 K/UL — SIGNIFICANT CHANGE UP (ref 3.8–10.5)
WBC # FLD AUTO: 4.14 K/UL — SIGNIFICANT CHANGE UP (ref 3.8–10.5)

## 2022-11-04 PROCEDURE — 95720 EEG PHY/QHP EA INCR W/VEEG: CPT

## 2022-11-04 NOTE — PROGRESS NOTE ADULT - PROBLEM SELECTOR PLAN 1
- after progressively worsening episodes of palpitations, diaphoresis, dizziness, spotty-->tunnelled-->then darkening of vision, then LOC.  Tremors and mild headache after regaining consciousness.  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
- after progressively worsening episodes of palpitations, diaphoresis, dizziness, spotty-->tunnelled-->then darkening of vision, then LOC.  Tremors and mild headache after regaining consciousness.  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
- after progressively worsening episodes of palpitations, diaphoresis, dizziness, spotty-->tunnelled-->then darkening of vision, then LOC.  Tremors and mild headache after regaining consciousness.  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
- after progressively worsening episodes of palpitations, diaphoresis, dizziness, spotty-->tunnelled-->then darkening of vision, then LOC.  Tremors and mild headache after regaining consciousness.  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

## 2022-11-04 NOTE — PROGRESS NOTE ADULT - PROBLEM SELECTOR PLAN 2
- no chest pain.  Some shortness of breath after regaining consciousness  - mildly dehydrated, but unlikely this is contributing to current complaints  - Trop = 7, TSH = 1.59, ECG = Sinus bradycardia w/arrhythmia at 59 bpm, QTc = 392, TSH = 1.59  - no overt electrolyte abnormalities  - possibility of anxiety; first episode resolved after pacing back and forth
- no chest pain.  Some shortness of breath after regaining consciousness  - mildly dehydrated, but unlikely this is contributing to current complaints  - Trop = 7, TSH = 1.59, ECG = Sinus bradycardia w/arrhythmia at 59 bpm, QTc = 392, TSH = 1.59  - no overt electrolyte abnormalities  - possibility of anxiety; first episode resolved after pacing back and forth  - f/u TTE (ordered)  - f/u lab-work  - f/u U-tox (ordered)  - cardio input

## 2022-11-04 NOTE — PROGRESS NOTE ADULT - PROBLEM SELECTOR PLAN 7
- low risk for DVT at present  - ambulate as tolerated

## 2022-11-04 NOTE — PROGRESS NOTE ADULT - PROBLEM SELECTOR PLAN 6
- none x a couple of months, per patient  - does not report any prescription for same presently, but chart review notes patient was on butalbital/acetaminophen/caffeine 50 mg-300 mg-40 mg PO daily in 2019  - unclear if any association w/ current signs/symptoms  - evaluate for any recurrence

## 2022-11-04 NOTE — PROGRESS NOTE ADULT - PROBLEM SELECTOR PLAN 4
- previously weight 195 to 200 lbs; now weighs ~ 169 lbs  CT c/a/p : no acute pathology
- previously weight 195 to 200 lbs; now weighs ~ 169 lbs  CT c/a/p : no acute pathology
- previously weight 195 to 200 lbs; now weighs ~ 169 lbs  - over the 3 weeks since onset of s/s noted above, complicated by loss of appetite, persistent watery diarrhea  - encourage oral nutrition and hydration, as tolerated
- previously weight 195 to 200 lbs; now weighs ~ 169 lbs  CT c/a/p

## 2022-11-04 NOTE — DIETITIAN INITIAL EVALUATION ADULT - PERTINENT LABORATORY DATA
11-04    140  |  104  |  10  ----------------------------<  82  4.3   |  24  |  0.86    Ca    9.3      04 Nov 2022 05:50  Phos  4.1     11-04  Mg     1.80     11-04

## 2022-11-04 NOTE — DIETITIAN INITIAL EVALUATION ADULT - OTHER INFO
Pt has a history of migraine headaches. He presented with chest tightness.  Pt reports having a poor appetite and poor intake. He reports having nausea and watery diarrhea for 3 week. Pt states he is eating only because he knows he has to. Pt states the nausea has resolved but his appetite has remained poor. Pt is amenable to drinking Ensure supplements.  Pt's usual weight is 195 to 200 lbs. His admission weight was 169.3 lbs. Pt had 30 lbs weight loss in 3 mos.   Encouraged Pt to increase po intake.

## 2022-11-04 NOTE — PROGRESS NOTE ADULT - PROBLEM SELECTOR PLAN 3
- ~ 3 weeks, and associated with the signs/symptoms indicated above  - unclear etiology  - takes probiotic daily (holding for now since taking w/o improvement)  - patient with signs of dehydration  - IVF hydration prescribed; LR one liter bolus, followed by LR at 125 mL/Hr x 8 hours  - improved
- ~ 3 weeks, and associated with the signs/symptoms indicated above  - unclear etiology  - takes probiotic daily (holding for now since taking w/o improvement)  - patient with signs of dehydration  - IVF hydration prescribed; LR one liter bolus, followed by LR at 125 mL/Hr x 8 hours  - f/u electrolytes and other lab-work (as above)  - f/u stool culture (ordered)  - if no improvement, could get GI input
- ~ 3 weeks, and associated with the signs/symptoms indicated above  - unclear etiology  - takes probiotic daily (holding for now since taking w/o improvement)  - patient with signs of dehydration  - IVF hydration prescribed; LR one liter bolus, followed by LR at 125 mL/Hr x 8 hours  - improved
- ~ 3 weeks, and associated with the signs/symptoms indicated above  - unclear etiology  - takes probiotic daily (holding for now since taking w/o improvement)  - patient with signs of dehydration  - IVF hydration prescribed; LR one liter bolus, followed by LR at 125 mL/Hr x 8 hours  - improved

## 2022-11-04 NOTE — CHART NOTE - NSCHARTNOTEFT_GEN_A_CORE
EEG preliminary read (not final) on the initial recording hour(s) = x 1.5    No seizures recorded. Normal EEG     [facility] EEG Reading Room Ph#: [phone]  Epilepsy Answering Service after 5PM and before 8:30AM: Ph#: [phone]

## 2022-11-04 NOTE — DIETITIAN NUTRITION RISK NOTIFICATION - TREATMENT: THE FOLLOWING DIET HAS BEEN RECOMMENDED
Diet, Regular:   Supplement Feeding Modality:  Oral  Ensure Plus High Protein Cans or Servings Per Day:  3       Frequency:  Daily (11-04-22 @ 16:42) [Active]

## 2022-11-04 NOTE — DIETITIAN INITIAL EVALUATION ADULT - PERTINENT MEDS FT
MEDICATIONS  (STANDING):  lactated ringers. 1000 milliLiter(s) (125 mL/Hr) IV Continuous <Continuous>  sodium chloride 0.9%. 1000 milliLiter(s) (100 mL/Hr) IV Continuous <Continuous>    MEDICATIONS  (PRN):  acetaminophen     Tablet .. 650 milliGRAM(s) Oral every 6 hours PRN Mild Pain (1 - 3)  ALPRAZolam 0.5 milliGRAM(s) Oral every 8 hours PRN Anxiety/Panic attack  melatonin 3 milliGRAM(s) Oral at bedtime PRN Insomnia  ondansetron Injectable 4 milliGRAM(s) IV Push every 8 hours PRN Nausea and/or Vomiting  simethicone 80 milliGRAM(s) Chew every 8 hours PRN Gas

## 2022-11-04 NOTE — PROGRESS NOTE ADULT - ASSESSMENT
[ x ]  Lab studies personally reviewed  [ x ]  Radiology personally reviewed  [ x ]  Old records personally reviewed    27 year old male, with past history significant for Migraine headaches, presented to the ED secondary to signs/symptoms similar to that of a panic attack, as well as chest tightness.  Diagnosed with Syncope and Palpitations in the ED.

## 2022-11-05 ENCOUNTER — TRANSCRIPTION ENCOUNTER (OUTPATIENT)
Age: 27
End: 2022-11-05

## 2022-11-05 VITALS
TEMPERATURE: 99 F | SYSTOLIC BLOOD PRESSURE: 129 MMHG | RESPIRATION RATE: 18 BRPM | DIASTOLIC BLOOD PRESSURE: 75 MMHG | HEART RATE: 80 BPM | OXYGEN SATURATION: 100 %

## 2022-11-05 LAB
ANION GAP SERPL CALC-SCNC: 10 MMOL/L — SIGNIFICANT CHANGE UP (ref 7–14)
BUN SERPL-MCNC: 16 MG/DL — SIGNIFICANT CHANGE UP (ref 7–23)
CALCIUM SERPL-MCNC: 9.1 MG/DL — SIGNIFICANT CHANGE UP (ref 8.4–10.5)
CHLORIDE SERPL-SCNC: 101 MMOL/L — SIGNIFICANT CHANGE UP (ref 98–107)
CO2 SERPL-SCNC: 26 MMOL/L — SIGNIFICANT CHANGE UP (ref 22–31)
CREAT SERPL-MCNC: 0.9 MG/DL — SIGNIFICANT CHANGE UP (ref 0.5–1.3)
EGFR: 121 ML/MIN/1.73M2 — SIGNIFICANT CHANGE UP
GLUCOSE SERPL-MCNC: 80 MG/DL — SIGNIFICANT CHANGE UP (ref 70–99)
HCT VFR BLD CALC: 40.9 % — SIGNIFICANT CHANGE UP (ref 39–50)
HGB BLD-MCNC: 14.1 G/DL — SIGNIFICANT CHANGE UP (ref 13–17)
MAGNESIUM SERPL-MCNC: 1.9 MG/DL — SIGNIFICANT CHANGE UP (ref 1.6–2.6)
MCHC RBC-ENTMCNC: 27.4 PG — SIGNIFICANT CHANGE UP (ref 27–34)
MCHC RBC-ENTMCNC: 34.5 GM/DL — SIGNIFICANT CHANGE UP (ref 32–36)
MCV RBC AUTO: 79.6 FL — LOW (ref 80–100)
NRBC # BLD: 0 /100 WBCS — SIGNIFICANT CHANGE UP (ref 0–0)
NRBC # FLD: 0 K/UL — SIGNIFICANT CHANGE UP (ref 0–0)
PHOSPHATE SERPL-MCNC: 4 MG/DL — SIGNIFICANT CHANGE UP (ref 2.5–4.5)
PLATELET # BLD AUTO: 250 K/UL — SIGNIFICANT CHANGE UP (ref 150–400)
POTASSIUM SERPL-MCNC: 3.7 MMOL/L — SIGNIFICANT CHANGE UP (ref 3.5–5.3)
POTASSIUM SERPL-SCNC: 3.7 MMOL/L — SIGNIFICANT CHANGE UP (ref 3.5–5.3)
RBC # BLD: 5.14 M/UL — SIGNIFICANT CHANGE UP (ref 4.2–5.8)
RBC # FLD: 12.7 % — SIGNIFICANT CHANGE UP (ref 10.3–14.5)
SODIUM SERPL-SCNC: 137 MMOL/L — SIGNIFICANT CHANGE UP (ref 135–145)
WBC # BLD: 4.67 K/UL — SIGNIFICANT CHANGE UP (ref 3.8–10.5)
WBC # FLD AUTO: 4.67 K/UL — SIGNIFICANT CHANGE UP (ref 3.8–10.5)

## 2022-11-05 NOTE — DISCHARGE NOTE PROVIDER - DETAILS OF MALNUTRITION DIAGNOSIS/DIAGNOSES
This patient has been assessed with a concern for Malnutrition and was treated during this hospitalization for the following Nutrition diagnosis/diagnoses:     -  11/04/2022: Severe protein-calorie malnutrition

## 2022-11-05 NOTE — DISCHARGE NOTE NURSING/CASE MANAGEMENT/SOCIAL WORK - NSDCPEFALRISK_GEN_ALL_CORE
For information on Fall & Injury Prevention, visit: [email or web address] OR  [email or web address] OR  https://www.cdc.gov/steadi/patient.html

## 2022-11-05 NOTE — DISCHARGE NOTE NURSING/CASE MANAGEMENT/SOCIAL WORK - PATIENT PORTAL LINK FT
You can access the FollowMyHealth Patient Portal offered by [facility] by registering at the following website: [email or web address]. By joining [employer]’s FollowMyHealth portal, you will also be able to view your health information using other applications (apps) compatible with our system.

## 2022-11-05 NOTE — PROGRESS NOTE ADULT - SUBJECTIVE AND OBJECTIVE BOX
Cardiovascular Disease Progress Note  Date of Service: 11-02-22 @ 06:34    Overnight events: No acute events overnight.  Patient denies chest pain or SOB.     Otherwise review of systems negative    Objective Findings:  T(C): 36.6 (11-02-22 @ 00:38), Max: 36.6 (11-01-22 @ 09:06)  HR: 54 (11-02-22 @ 00:38) (54 - 75)  BP: 116/84 (11-02-22 @ 00:38) (110/63 - 135/74)  RR: 18 (11-02-22 @ 00:38) (17 - 18)  SpO2: 98% (11-02-22 @ 00:38) (98% - 100%)  Wt(kg): --  Daily Height in cm: 175.26 (01 Nov 2022 09:06)    Daily       Physical Exam:  Gen: NAD; Patient resting comfortably  HEENT: EOMI, Normocephalic/ atraumatic  CV: RRR, normal S1 + S2, no m/r/g  Lungs:  Normal respiratory effort; clear to auscultation bilaterally  Abd: soft, non-tender; bowel sounds present  Ext: No edema; warm and well perfused    Telemetry: Sinus    Laboratory Data:                        13.7   4.06  )-----------( 239      ( 01 Nov 2022 06:22 )             40.1     11-01    139  |  104  |  12  ----------------------------<  82  3.6   |  26  |  0.86    Ca    9.2      01 Nov 2022 06:22  Phos  4.2     11-01  Mg     1.70     11-01    TPro  8.0  /  Alb  5.2<H>  /  TBili  0.6  /  DBili  x   /  AST  20  /  ALT  19  /  AlkPhos  59  10-31              Inpatient Medications:  MEDICATIONS  (STANDING):  lactated ringers. 1000 milliLiter(s) (125 mL/Hr) IV Continuous <Continuous>      Assessment: 27 year old male with  Migraine headaches presentswith syncope and palpitations    Plan of Care:    #Syncope and collapse-  Unclear etiology.   Patient reports weight loss and diarrhea.   Echocardiogram expedited to assess for structural heart disease.  Thus far, no malignant arrhythmias on telemetry.   Orthostatics negative.  Appreciate neurology input.       Over 35 minutes spent on total encounter; more than 50% of the visit was spent counseling and/or coordinating care by the attending physician.      Ezio Chappell MD [facility]  Cardiovascular Disease  [phone]
Cardiovascular Disease Progress Note  Date of Service: 11-03-22 @ 07:43    Overnight events: No acute events overnight.   Patient denies chest pain or SOB.    Otherwise review of systems negative    Objective Findings:  T(C): 36.5 (11-03-22 @ 04:53), Max: 36.7 (11-02-22 @ 10:41)  HR: 70 (11-03-22 @ 04:53) (60 - 96)  BP: 108/52 (11-03-22 @ 04:53) (108/52 - 140/83)  RR: 18 (11-03-22 @ 04:53) (17 - 18)  SpO2: 100% (11-03-22 @ 04:53) (98% - 100%)  Wt(kg): --  Daily     Daily       Physical Exam:  Gen: NAD; Patient resting comfortably  HEENT: EOMI, Normocephalic/ atraumatic  CV: RRR, normal S1 + S2, no m/r/g  Lungs:  Normal respiratory effort; clear to auscultation bilaterally  Abd: soft, non-tender; bowel sounds present  Ext: No edema; warm and well perfused    Telemetry: Sinus; no ectopy    Laboratory Data:                        14.2   4.33  )-----------( 262      ( 02 Nov 2022 06:09 )             41.4     11-02    140  |  106  |  12  ----------------------------<  95  3.9   |  25  |  0.90    Ca    9.4      02 Nov 2022 06:09  Phos  3.6     11-02  Mg     1.80     11-02                Inpatient Medications:  MEDICATIONS  (STANDING):  lactated ringers. 1000 milliLiter(s) (125 mL/Hr) IV Continuous <Continuous>  sodium chloride 0.9%. 1000 milliLiter(s) (100 mL/Hr) IV Continuous <Continuous>      Assessment:  27 year old male with  Migraine headaches presents with syncope and palpitations    Plan of Care:    #Syncope and collapse-  Echocardiogram reveals no structural heart disease.  No ectopy on telemetry and orthostatics negative.  Unlikely cardiac etiology of syncope.   No further inpatient cardiac work up warranted at this time.     Over 25 minutes spent on total encounter; more than 50% of the visit was spent counseling and/or coordinating care by the attending physician.      Ezio Chappell MD [facility]  Cardiovascular Disease  [phone]
Cardiovascular Disease Progress Note  Date of Service: 11-04-22 @ 07:29    Overnight events: No acute events overnight.   Patient denies chest pain or SOB.    Otherwise review of systems negative    Objective Findings:  T(C): 36.5 (11-04-22 @ 05:46), Max: 37.1 (11-03-22 @ 15:55)  HR: 61 (11-04-22 @ 05:46) (60 - 77)  BP: 106/65 (11-04-22 @ 05:46) (106/65 - 121/66)  RR: 18 (11-04-22 @ 05:46) (17 - 18)  SpO2: 100% (11-04-22 @ 05:46) (100% - 100%)  Wt(kg): --  Daily     Daily       Physical Exam:  Gen: NAD; Patient resting comfortably  HEENT: EOMI, Normocephalic/ atraumatic  CV: RRR, normal S1 + S2, no m/r/g  Lungs:  Normal respiratory effort; clear to auscultation bilaterally  Abd: soft, non-tender; bowel sounds present  Ext: No edema; warm and well perfused    Telemetry: Sinus; no ectopy    Laboratory Data:                        15.5   4.48  )-----------( 273      ( 03 Nov 2022 13:44 )             45.3     11-03    138  |  102  |  11  ----------------------------<  80  3.8   |  28  |  0.83    Ca    9.4      03 Nov 2022 13:44  Phos  3.7     11-03  Mg     2.00     11-03                Inpatient Medications:  MEDICATIONS  (STANDING):  lactated ringers. 1000 milliLiter(s) (125 mL/Hr) IV Continuous <Continuous>  sodium chloride 0.9%. 1000 milliLiter(s) (100 mL/Hr) IV Continuous <Continuous>      Assessment: 27 year old male with  Migraine headaches presents with syncope and palpitations    Plan of Care:    #Syncope and collapse-  Echocardiogram reveals no structural heart disease.  No ectopy on telemetry and orthostatics negative.  Unlikely cardiac etiology of syncope.   No further inpatient cardiac work up warranted at this time.       Over 25 minutes spent on total encounter; more than 50% of the visit was spent counseling and/or coordinating care by the attending physician.      Ezio Chappell MD [facility]  Cardiovascular Disease  [phone]
pt stable for dc   fu as op   see dc summary 
Date of service: 11-01-22 @ 11:08      Patient is a 27y old  Male who presents with a chief complaint of Syncope, Palpitations (01 Nov 2022 09:57)                                                               INTERVAL HPI/OVERNIGHT EVENTS:    REVIEW OF SYSTEMS:     CONSTITUTIONAL: No weakness, fevers or chills  EYES/ENT: No visual changes , no ear ache   NECK: No pain or stiffness  RESPIRATORY: No cough, wheezing,  No shortness of breath  CARDIOVASCULAR: No chest pain or palpitations  GASTROINTESTINAL: No abdominal pain  . No nausea, vomiting, or hematemesis; No diarrhea or constipation. No melena or hematochezia.  GENITOURINARY: No dysuria, frequency or hematuria  NEUROLOGICAL: No numbness or weakness  SKIN: No itching, burning, rashes, or lesions                                                                                                                                                                                                                                                                                 Medications:  MEDICATIONS  (STANDING):  lactated ringers. 1000 milliLiter(s) (125 mL/Hr) IV Continuous <Continuous>    MEDICATIONS  (PRN):  acetaminophen     Tablet .. 650 milliGRAM(s) Oral every 6 hours PRN Mild Pain (1 - 3)  ALPRAZolam 0.5 milliGRAM(s) Oral every 8 hours PRN Anxiety/Panic attack  melatonin 3 milliGRAM(s) Oral at bedtime PRN Insomnia  ondansetron Injectable 4 milliGRAM(s) IV Push every 8 hours PRN Nausea and/or Vomiting  simethicone 80 milliGRAM(s) Chew every 8 hours PRN Gas       Allergies    No Known Allergies    Intolerances      Vital Signs Last 24 Hrs  T(C): 36.6 (01 Nov 2022 09:06), Max: 36.8 (31 Oct 2022 16:14)  T(F): 97.9 (01 Nov 2022 09:06), Max: 98.3 (31 Oct 2022 16:14)  HR: 71 (01 Nov 2022 09:06) (56 - 73)  BP: 121/79 (01 Nov 2022 09:06) (116/70 - 134/89)  BP(mean): --  RR: 17 (01 Nov 2022 09:06) (16 - 18)  SpO2: 100% (01 Nov 2022 09:06) (100% - 100%)    Parameters below as of 01 Nov 2022 09:06  Patient On (Oxygen Delivery Method): room air      CAPILLARY BLOOD GLUCOSE          10-31 @ 07:01  -  11-01 @ 07:00  --------------------------------------------------------  IN: 825 mL / OUT: 200 mL / NET: 625 mL      Physical Exam:    Daily Height in cm: 175.26 (01 Nov 2022 09:06)    Daily   General:  Well appearing, NAD, not cachetic  HEENT:  Nonicteric, PERRLA  CV:  RRR, S1S2   Lungs:  CTA B/L, no wheezes, rales, rhonchi  Abdomen:  Soft, non-tender, no distended, positive BS  Extremities:  2+ pulses, no c/c, no edema  Skin:  Warm and dry, no rashes  :  No rodriguez  Neuro:  AAOx3, non-focal, grossly intact                                                                                                                                                                                                                                                                                                LABS:                               13.7   4.06  )-----------( 239      ( 01 Nov 2022 06:22 )             40.1                      11-01    139  |  104  |  12  ----------------------------<  82  3.6   |  26  |  0.86    Ca    9.2      01 Nov 2022 06:22  Phos  4.2     11-01  Mg     1.70     11-01    TPro  8.0  /  Alb  5.2<H>  /  TBili  0.6  /  DBili  x   /  AST  20  /  ALT  19  /  AlkPhos  59  10-31                       RADIOLOGY & ADDITIONAL TESTS         I personally reviewed: [  ]EKG   [  ]CXR    [  ] CT      A/P:         Discussed with :     Toni consultants' Notes   Time spent :  
Date of service: 11-04-22 @ 15:22      Patient is a 27y old  Male who presents with a chief complaint of Syncope, Palpitations (04 Nov 2022 07:28)                                                               INTERVAL HPI/OVERNIGHT EVENTS:    REVIEW OF SYSTEMS:     CONSTITUTIONAL: No weakness, fevers or chills  EYES/ENT: No visual changes , no ear ache   NECK: No pain or stiffness  RESPIRATORY: No cough, wheezing,  No shortness of breath  CARDIOVASCULAR: No chest pain or palpitations  GASTROINTESTINAL: No abdominal pain  . No nausea, vomiting, or hematemesis; No diarrhea or constipation. No melena or hematochezia.  GENITOURINARY: No dysuria, frequency or hematuria  NEUROLOGICAL: No numbness or weakness  SKIN: No itching, burning, rashes, or lesions                                                                                                                                                                                                                                                                                 Medications:  MEDICATIONS  (STANDING):  lactated ringers. 1000 milliLiter(s) (125 mL/Hr) IV Continuous <Continuous>  sodium chloride 0.9%. 1000 milliLiter(s) (100 mL/Hr) IV Continuous <Continuous>    MEDICATIONS  (PRN):  acetaminophen     Tablet .. 650 milliGRAM(s) Oral every 6 hours PRN Mild Pain (1 - 3)  ALPRAZolam 0.5 milliGRAM(s) Oral every 8 hours PRN Anxiety/Panic attack  melatonin 3 milliGRAM(s) Oral at bedtime PRN Insomnia  ondansetron Injectable 4 milliGRAM(s) IV Push every 8 hours PRN Nausea and/or Vomiting  simethicone 80 milliGRAM(s) Chew every 8 hours PRN Gas       Allergies    No Known Allergies    Intolerances      Vital Signs Last 24 Hrs  T(C): 36.7 (04 Nov 2022 13:13), Max: 37.1 (03 Nov 2022 15:55)  T(F): 98 (04 Nov 2022 13:13), Max: 98.7 (03 Nov 2022 15:55)  HR: 95 (04 Nov 2022 13:13) (58 - 95)  BP: 115/76 (04 Nov 2022 13:13) (106/65 - 121/66)  BP(mean): --  RR: 17 (04 Nov 2022 13:13) (17 - 18)  SpO2: 100% (04 Nov 2022 13:13) (100% - 100%)    Parameters below as of 04 Nov 2022 13:13  Patient On (Oxygen Delivery Method): room air      CAPILLARY BLOOD GLUCOSE          11-03 @ 07:01  -  11-04 @ 07:00  --------------------------------------------------------  IN: 480 mL / OUT: 200 mL / NET: 280 mL      Physical Exam:    Daily     Daily   General:  Well appearing, NAD, not cachetic  HEENT:  Nonicteric, PERRLA  CV:  RRR, S1S2   Lungs:  CTA B/L, no wheezes, rales, rhonchi  Abdomen:  Soft, non-tender, no distended, positive BS  Extremities:  2+ pulses, no c/c, no edema  Skin:  Warm and dry, no rashes  :  No rodriguez  Neuro:  AAOx3, non-focal, grossly intact                                                                                                                                                                                                                                                                                                LABS:                               13.7   4.14  )-----------( 254      ( 04 Nov 2022 05:50 )             40.7                      11-04    140  |  104  |  10  ----------------------------<  82  4.3   |  24  |  0.86    Ca    9.3      04 Nov 2022 05:50  Phos  4.1     11-04  Mg     1.80     11-04                         RADIOLOGY & ADDITIONAL TESTS         I personally reviewed: [  ]EKG   [  ]CXR    [  ] CT      A/P:         Discussed with :     Toni consultants' Notes   Time spent :  
Date of service: 11-02-22 @ 19:14      Patient is a 27y old  Male who presents with a chief complaint of Syncope, Palpitations (02 Nov 2022 06:34)                                                               INTERVAL HPI/OVERNIGHT EVENTS:    REVIEW OF SYSTEMS:     CONSTITUTIONAL: No weakness, fevers or chills  RESPIRATORY: No cough, wheezing,  No shortness of breath  CARDIOVASCULAR: No chest pain or palpitations  GASTROINTESTINAL: No abdominal pain  . No nausea, vomiting, or hematemesis; No diarrhea or constipation. No melena or hematochezia.  GENITOURINARY: No dysuria, frequency or hematuria  NEUROLOGICAL: No numbness or weakness                                                                                                                                                                                                                                                                                Medications:  MEDICATIONS  (STANDING):  lactated ringers. 1000 milliLiter(s) (125 mL/Hr) IV Continuous <Continuous>  sodium chloride 0.9%. 1000 milliLiter(s) (100 mL/Hr) IV Continuous <Continuous>    MEDICATIONS  (PRN):  acetaminophen     Tablet .. 650 milliGRAM(s) Oral every 6 hours PRN Mild Pain (1 - 3)  ALPRAZolam 0.5 milliGRAM(s) Oral every 8 hours PRN Anxiety/Panic attack  melatonin 3 milliGRAM(s) Oral at bedtime PRN Insomnia  ondansetron Injectable 4 milliGRAM(s) IV Push every 8 hours PRN Nausea and/or Vomiting  simethicone 80 milliGRAM(s) Chew every 8 hours PRN Gas       Allergies    No Known Allergies    Intolerances      Vital Signs Last 24 Hrs  T(C): 36.7 (02 Nov 2022 16:35), Max: 36.7 (02 Nov 2022 06:29)  T(F): 98.1 (02 Nov 2022 16:35), Max: 98.1 (02 Nov 2022 06:29)  HR: 60 (02 Nov 2022 16:35) (54 - 95)  BP: 140/83 (02 Nov 2022 16:35) (116/84 - 140/83)  BP(mean): --  RR: 18 (02 Nov 2022 16:35) (17 - 18)  SpO2: 100% (02 Nov 2022 16:35) (98% - 100%)    Parameters below as of 02 Nov 2022 16:35  Patient On (Oxygen Delivery Method): room air      CAPILLARY BLOOD GLUCOSE          11-02 @ 07:01  -  11-02 @ 19:14  --------------------------------------------------------  IN: 100 mL / OUT: 1000 mL / NET: -900 mL      Physical Exam:    Daily     Daily   General:  Well appearing, NAD, not cachetic  HEENT:  Nonicteric, PERRLA  CV:  RRR, S1S2   Lungs:  CTA B/L, no wheezes, rales, rhonchi  Abdomen:  Soft, non-tender, no distended, positive BS  Extremities: no edema  Neuro:  AAOx3, non-focal, grossly intact                                                                                                                                                                                                                                                                                                LABS:                               14.2   4.33  )-----------( 262      ( 02 Nov 2022 06:09 )             41.4                      11-02    140  |  106  |  12  ----------------------------<  95  3.9   |  25  |  0.90    Ca    9.4      02 Nov 2022 06:09  Phos  3.6     11-02  Mg     1.80     11-02                         RADIOLOGY & ADDITIONAL TESTS         I personally reviewed: [  ]EKG   [  ]CXR    [  ] CT      A/P:         Discussed with :     Toni consultants' Notes   Time spent :  
Date of service: 11-03-22 @ 17:02      Patient is a 27y old  Male who presents with a chief complaint of Syncope, Palpitations (03 Nov 2022 07:43)                                                               INTERVAL HPI/OVERNIGHT EVENTS:    REVIEW OF SYSTEMS:     CONSTITUTIONAL: No weakness, fevers or chills  EYES/ENT: No visual changes , no ear ache   NECK: No pain or stiffness  RESPIRATORY: No cough, wheezing,  No shortness of breath  CARDIOVASCULAR: No chest pain or palpitations  GASTROINTESTINAL: No abdominal pain  . No nausea, vomiting, or hematemesis; No diarrhea or constipation. No melena or hematochezia.  GENITOURINARY: No dysuria, frequency or hematuria  NEUROLOGICAL: No numbness or weakness  SKIN: No itching, burning, rashes, or lesions                                                                                                                                                                                                                                                                                 Medications:  MEDICATIONS  (STANDING):  lactated ringers. 1000 milliLiter(s) (125 mL/Hr) IV Continuous <Continuous>  sodium chloride 0.9%. 1000 milliLiter(s) (100 mL/Hr) IV Continuous <Continuous>    MEDICATIONS  (PRN):  acetaminophen     Tablet .. 650 milliGRAM(s) Oral every 6 hours PRN Mild Pain (1 - 3)  ALPRAZolam 0.5 milliGRAM(s) Oral every 8 hours PRN Anxiety/Panic attack  melatonin 3 milliGRAM(s) Oral at bedtime PRN Insomnia  ondansetron Injectable 4 milliGRAM(s) IV Push every 8 hours PRN Nausea and/or Vomiting  simethicone 80 milliGRAM(s) Chew every 8 hours PRN Gas       Allergies    No Known Allergies    Intolerances      Vital Signs Last 24 Hrs  T(C): 37.1 (03 Nov 2022 15:55), Max: 37.1 (03 Nov 2022 15:55)  T(F): 98.7 (03 Nov 2022 15:55), Max: 98.7 (03 Nov 2022 15:55)  HR: 63 (03 Nov 2022 15:55) (63 - 96)  BP: 121/66 (03 Nov 2022 15:55) (108/52 - 131/65)  BP(mean): --  RR: 18 (03 Nov 2022 15:55) (18 - 18)  SpO2: 100% (03 Nov 2022 15:55) (98% - 100%)    Parameters below as of 03 Nov 2022 15:55  Patient On (Oxygen Delivery Method): room air      CAPILLARY BLOOD GLUCOSE          11-02 @ 07:01  -  11-03 @ 07:00  --------------------------------------------------------  IN: 1120 mL / OUT: 1400 mL / NET: -280 mL      Physical Exam:    Daily     Daily   General:  Well appearing, NAD, not cachetic  HEENT:  Nonicteric, PERRLA  CV:  RRR, S1S2   Lungs:  CTA B/L, no wheezes, rales, rhonchi  Abdomen:  Soft, non-tender, no distended, positive BS  Extremities:  2+ pulses, no c/c, no edema  Skin:  Warm and dry, no rashes  :  No rodriguez  Neuro:  AAOx3, non-focal, grossly intact                                                                                                                                                                                                                                                                                                LABS:                               15.5   4.48  )-----------( 273      ( 03 Nov 2022 13:44 )             45.3                      11-03    138  |  102  |  11  ----------------------------<  80  3.8   |  28  |  0.83    Ca    9.4      03 Nov 2022 13:44  Phos  3.7     11-03  Mg     2.00     11-03                         RADIOLOGY & ADDITIONAL TESTS         I personally reviewed: [  ]EKG   [  ]CXR    [  ] CT      A/P:         Discussed with :     Toni consultants' Notes   Time spent :

## 2022-11-05 NOTE — EEG REPORT - NS EEG TEXT BOX
[facility]   COMPREHENSIVE EPILEPSY CENTER   REPORT OF CONTINUOUS VIDEO EEG     [facility]: [address], [place] [address], Ph#: [phone]  [facility]: [address], [place] [address], Ph#: [phone]  Office: [address] [place] [address], [place] [address], [place] [address] Ph#: [phone]    Patient Name: DAYSI NEW  Age and : 27y ([birth year])  MRN #: [id number]  Location: [address]  Referring Physician: Vaughn Smith    Study Date: 22-22 14 hours    _____________________________________________________________  STUDY INFORMATION    EEG Recording Technique:  The patient underwent continuous Video-EEG monitoring, using Telemetry System hardware on the XLTek Digital System. EEG and video data were stored on a computer hard drive with important events saved in digital archive files. The material was reviewed by a physician (electroencephalographer / epileptologist) on a daily basis. Fer and seizure detection algorithms were utilized and reviewed. An EEG Technician attended to the patient, and was available throughout daytime work hours.  The epilepsy center neurologist was available in person or on call 24-hours per day.    EEG Placement and Labeling of Electrodes:  The EEG was performed utilizing 20 channel referential EEG connections (coronal over temporal over parasagittal montage) using all standard 10-20 electrode placements with EKG, with additional electrodes placed in the inferior temporal region using the modified 10-10 montage electrode placements for elective admissions, or if deemed necessary. Recording was at a sampling rate of 256 samples per second per channel. Time synchronized digital video recording was done simultaneously with EEG recording. A low light infrared camera was used for low light recording.     _____________________________________________________________  HISTORY    Patient is a 27y old  Male who presents with a chief complaint of Syncope, Palpitations (2022 12:21)      PERTINENT MEDICATION:  MEDICATIONS  (STANDING):  lactated ringers. 1000 milliLiter(s) (125 mL/Hr) IV Continuous <Continuous>  sodium chloride 0.9%. 1000 milliLiter(s) (100 mL/Hr) IV Continuous <Continuous>    _____________________________________________________________  INTERPRETATION    Findings: The background was continuous, spontaneously variable and reactive. During wakefulness, the posterior dominant rhythm consisted of symmetric, well-modulated 8 Hz activity, with amplitude to 30 uV, that attenuated to eye opening.  Low amplitude frontal beta was noted in wakefulness.      Background Slowing:  No generalized background slowing was present.    Focal Slowing:   None were present.    Sleep Background:  Drowsiness was characterized by fragmentation, attenuation, and slowing of the background activity.    Sleep was characterized by the presence of vertex waves, symmetric sleep spindles and K-complexes.      Other Non-Epileptiform Findings:  None were present.    Interictal Epileptiform Activity:   None were present.    Events:  Clinical events: None recorded.  Seizures: None recorded.    Artifacts:  Intermittent myogenic and movement artifacts were noted.    ECG:  The heart rate on single channel ECG was predominantly between 70-80 BPM.    _____________________________________________________________  EEG SUMMARY/CLASSIFICATION    Normal  EEG in the awake, drowsy and asleep states.    _____________________________________________________________  EEG IMPRESSION/CLINICAL CORRELATE    Normal EEG study.  No epileptic pattern or seizure seen.        Petrona Bella MD  Epilepsy Attending, [facility] Epilepsy [place]     [facility]   COMPREHENSIVE EPILEPSY CENTER   REPORT OF CONTINUOUS VIDEO EEG     [facility]: [address], [place] [address], Ph#: [phone]  [facility]: [address], [place] [address], Ph#: [phone]  Office: [address] [place] [address], [facility] [address], [place] [address] Ph#: [phone]    Patient Name: DAYSI NEW  Age and : 27y ([birth year])  MRN #: [id number]  Location: [address]  Referring Physician: Vaughn Smith    Study Date: 22-22 14 hours  22 - 22 -  hr 47 min   Total: 19 hr 47 min   _____________________________________________________________  STUDY INFORMATION    EEG Recording Technique:  The patient underwent continuous Video-EEG monitoring, using Telemetry System hardware on the XLTek Digital System. EEG and video data were stored on a computer hard drive with important events saved in digital archive files. The material was reviewed by a physician (electroencephalographer / epileptologist) on a daily basis. Fer and seizure detection algorithms were utilized and reviewed. An EEG Technician attended to the patient, and was available throughout daytime work hours.  The epilepsy center neurologist was available in person or on call 24-hours per day.    EEG Placement and Labeling of Electrodes:  The EEG was performed utilizing 20 channel referential EEG connections (coronal over temporal over parasagittal montage) using all standard 10-20 electrode placements with EKG, with additional electrodes placed in the inferior temporal region using the modified 10-10 montage electrode placements for elective admissions, or if deemed necessary. Recording was at a sampling rate of 256 samples per second per channel. Time synchronized digital video recording was done simultaneously with EEG recording. A low light infrared camera was used for low light recording.     _____________________________________________________________  HISTORY    Patient is a 27y old  Male who presents with a chief complaint of Syncope, Palpitations (2022 12:21)      PERTINENT MEDICATION:  MEDICATIONS  (STANDING):  lactated ringers. 1000 milliLiter(s) (125 mL/Hr) IV Continuous <Continuous>  sodium chloride 0.9%. 1000 milliLiter(s) (100 mL/Hr) IV Continuous <Continuous>    _____________________________________________________________  INTERPRETATION    Findings: The background was continuous, spontaneously variable and reactive. During wakefulness, the posterior dominant rhythm consisted of symmetric, well-modulated 8 Hz activity, with amplitude to 30 uV, that attenuated to eye opening.  Low amplitude frontal beta was noted in wakefulness.      Background Slowing:  No generalized background slowing was present.    Focal Slowing:   None were present.    Sleep Background:  Drowsiness was characterized by fragmentation, attenuation, and slowing of the background activity.    Sleep was characterized by the presence of vertex waves, symmetric sleep spindles and K-complexes.      Other Non-Epileptiform Findings:  None were present.    Interictal Epileptiform Activity:   None were present.    Events:  Clinical events: None recorded.  Seizures: None recorded.    Artifacts:  Intermittent myogenic and movement artifacts were noted.    ECG:  The heart rate on single channel ECG was predominantly between 70-80 BPM.    _____________________________________________________________  EEG SUMMARY/CLASSIFICATION    Normal  EEG in the awake, drowsy and asleep states.    _____________________________________________________________  EEG IMPRESSION/CLINICAL CORRELATE    Normal EEG study.  No epileptic pattern or seizure seen.        Petrona Bella MD  Epilepsy Attending, [facility] Epilepsy [place]

## 2022-11-05 NOTE — DISCHARGE NOTE PROVIDER - HOSPITAL COURSE
27 year old male, with past history significant for Migraine headaches, presented to the ED secondary to signs/symptoms similar to that of a panic attack, as well as chest tightness.  Diagnosed with Syncope and Palpitations in the ED.    Syncope.   - after progressively worsening episodes of palpitations, diaphoresis, dizziness, spotty-->tunnelled-->then darkening of vision, then LOC.  Tremors and mild headache after regaining consciousness.  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 (w/o the above s/s), witnessed repeatedly by mother  - last smoked marijuana ~ 3 weeks ago at the initial onset of s/s  - only takes a MVI and a probiotic at home  - Trop = 7, ECG = Sinus bradycardia w/arrhythmia at 59 bpm, QTc = 392, TSH = 1.59  - no signs of infection appreciated  - given alprazolam 1 mg PO in the ED; continuing w/ alprazolam 0.5 mg PO Q8H PRN anxiety/panic attack  - urine tox pending in lab   - echo : N L   - Neurology consult appreciated : fu eeg   MR : no acute pathology   IF eeg neg  then dc to fu as o p.    Palpitations.   - no chest pain.  Some shortness of breath after regaining consciousness  - mildly dehydrated, but unlikely this is contributing to current complaints  - Trop = 7, TSH = 1.59, ECG = Sinus bradycardia w/arrhythmia at 59 bpm, QTc = 392, TSH = 1.59  - no overt electrolyte abnormalities  - possibility of anxiety; first episode resolved after pacing back and forth.    Watery diarrhea.   - ~ 3 weeks, and associated with the signs/symptoms indicated above  - unclear etiology  - takes probiotic daily (holding for now since taking w/o improvement)  - patient with signs of dehydration  - IVF hydration prescribed; LR one liter bolus, followed by LR at 125 mL/Hr x 8 hours  - improved.    Rapid weight loss.   - previously weight 195 to 200 lbs; now weighs ~ 169 lbs  CT c/a/p : no acute pathology.    Dehydration.   - mildly dry oral mucosa.  Lab-work appears hemoconcentrated  - in the setting of persistent watery diarrhea and decreased oral intake (has to force himself re oral intake)  - IVF hydration as above (eval need for additional IVF)  - encourage oral hydration, as above  - f/u electrolytes.    Migraine headache.   - none x a couple of months, per patient  - does not report any prescription for same presently, but chart review notes patient was on butalbital/acetaminophen/caffeine 50 mg-300 mg-40 mg PO daily in 2019  - unclear if any association w/ current signs/symptoms  - evaluate for any recurrence.    Patient is medically stable for discharge on ---------------- per attending Dr. Smith.     27 year old male, with past history significant for Migraine headaches, presented to the ED secondary to signs/symptoms similar to that of a panic attack, as well as chest tightness.  Diagnosed with Syncope and Palpitations in the ED.    Syncope.   - after progressively worsening episodes of palpitations, diaphoresis, dizziness, spotty-->tunnelled-->then darkening of vision, then LOC.  Tremors and mild headache after regaining consciousness.  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 (w/o the above s/s), witnessed repeatedly by mother  - last smoked marijuana ~ 3 weeks ago at the initial onset of s/s  - only takes a MVI and a probiotic at home  - Trop = 7, ECG = Sinus bradycardia w/arrhythmia at 59 bpm, QTc = 392, TSH = 1.59  - no signs of infection appreciated  - given alprazolam 1 mg PO in the ED; continuing w/ alprazolam 0.5 mg PO Q8H PRN anxiety/panic attack  - urine tox pending in lab   - echo : N L   - Neurology consult appreciated : fu eeg   MR : no acute pathology   EEG negative     Palpitations.   - no chest pain.  Some shortness of breath after regaining consciousness  - mildly dehydrated, but unlikely this is contributing to current complaints  - Trop = 7, TSH = 1.59, ECG = Sinus bradycardia w/arrhythmia at 59 bpm, QTc = 392, TSH = 1.59  - no overt electrolyte abnormalities  - possibility of anxiety; first episode resolved after pacing back and forth.    Watery diarrhea.   - ~ 3 weeks, and associated with the signs/symptoms indicated above  - unclear etiology  - takes probiotic daily (holding for now since taking w/o improvement)  - patient with signs of dehydration  - IVF hydration prescribed; LR one liter bolus, followed by LR at 125 mL/Hr x 8 hours  - improved.    Rapid weight loss.   - previously weight 195 to 200 lbs; now weighs ~ 169 lbs  CT c/a/p : no acute pathology.    Dehydration.   - mildly dry oral mucosa.  Lab-work appears hemoconcentrated  - in the setting of persistent watery diarrhea and decreased oral intake (has to force himself re oral intake)  - IVF hydration as above (eval need for additional IVF)  - encourage oral hydration, as above  - f/u electrolytes.    Migraine headache.   - none x a couple of months, per patient  - does not report any prescription for same presently, but chart review notes patient was on butalbital/acetaminophen/caffeine 50 mg-300 mg-40 mg PO daily in 2019  - unclear if any association w/ current signs/symptoms  - evaluate for any recurrence.    Patient is medically stable for discharge on 11/5/2022 per attending Dr. Smith.

## 2022-11-05 NOTE — PROGRESS NOTE ADULT - NUTRITIONAL ASSESSMENT
This patient has been assessed with a concern for Malnutrition and has been determined to have a diagnosis/diagnoses of Severe protein-calorie malnutrition.    This patient is being managed with:   Diet Regular-  Supplement Feeding Modality:  Oral  Ensure Plus High Protein Cans or Servings Per Day:  3       Frequency:  Daily  Entered: Nov 4 2022  4:36PM

## 2022-11-05 NOTE — DISCHARGE NOTE PROVIDER - NSDCCPCAREPLAN_GEN_ALL_CORE_FT
PRINCIPAL DISCHARGE DIAGNOSIS  Diagnosis: Syncope  Assessment and Plan of Treatment: You were admitted in the hospital for syncope. You were monitored on telemetry during your inpatient stay with no acute events. You were seen by a neurologist and your workup including echocardiogram and MRI are unremarkable. Your EEG is -----------. Follow up with your primary care physician outpatient.        SECONDARY DISCHARGE DIAGNOSES  Diagnosis: Palpitations  Assessment and Plan of Treatment: Your cardiac workup is unremarkable. Follow up with your primary care physician outpatient. Return to the ED immediately if you start to experience chest pain, shortness of breath or worsening palpitations.     PRINCIPAL DISCHARGE DIAGNOSIS  Diagnosis: Syncope  Assessment and Plan of Treatment: You were admitted in the hospital for syncope. You were monitored on telemetry during your inpatient stay with no acute events. You were seen by a neurologist and your workup including echocardiogram and MRI are unremarkable. Your EEG is normal. Follow up with your primary care physician outpatient.        SECONDARY DISCHARGE DIAGNOSES  Diagnosis: Palpitations  Assessment and Plan of Treatment: Your cardiac workup is unremarkable. Follow up with your primary care physician outpatient. Return to the ED immediately if you start to experience chest pain, shortness of breath or worsening palpitations.

## 2022-11-05 NOTE — PROGRESS NOTE ADULT - REASON FOR ADMISSION
Syncope, Palpitations

## 2022-11-05 NOTE — DISCHARGE NOTE PROVIDER - NSDCMRMEDTOKEN_GEN_ALL_CORE_FT
Multiple Vitamins oral tablet: 1 tab(s) orally once a day  Probiotic Formula oral capsule: 1 cap(s) orally once a day

## 2022-11-11 LAB
5OH-INDOLEACETATE UR-MCNC: 1.7 MG/G CREAT — SIGNIFICANT CHANGE UP
CREAT ?TM UR-MCNC: 107 MG/DL — SIGNIFICANT CHANGE UP (ref 20–320)
SEROTONIN SER-MCNC: 220 NG/ML — SIGNIFICANT CHANGE UP

## 2022-11-15 LAB
METANEPHRINE, PL: <10 PG/ML — SIGNIFICANT CHANGE UP (ref 0–88)
NORMETANEPHRINE, PL: 11.3 PG/ML — SIGNIFICANT CHANGE UP (ref 0–210.1)

## 2023-01-30 NOTE — ED ADULT TRIAGE NOTE - CCCP TRG CHIEF CMPLNT
HA
Alert-The patient is alert, awake and responds to voice. The patient is oriented to time, place, and person. The triage nurse is able to obtain subjective information.
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
## 2025-02-10 ENCOUNTER — EMERGENCY (EMERGENCY)
Facility: HOSPITAL | Age: 30
LOS: 1 days | Discharge: HOME | End: 2025-02-10
Admitting: STUDENT IN AN ORGANIZED HEALTH CARE EDUCATION/TRAINING PROGRAM
Payer: COMMERCIAL

## 2025-02-10 VITALS
TEMPERATURE: 98 F | SYSTOLIC BLOOD PRESSURE: 117 MMHG | WEIGHT: 195.11 LBS | HEART RATE: 63 BPM | DIASTOLIC BLOOD PRESSURE: 75 MMHG | OXYGEN SATURATION: 100 % | RESPIRATION RATE: 18 BRPM

## 2025-02-10 DIAGNOSIS — Z98.890 OTHER SPECIFIED POSTPROCEDURAL STATES: Chronic | ICD-10-CM

## 2025-02-10 PROCEDURE — 71101 X-RAY EXAM UNILAT RIBS/CHEST: CPT | Mod: 26,LT

## 2025-02-10 PROCEDURE — 71046 X-RAY EXAM CHEST 2 VIEWS: CPT | Mod: 26

## 2025-02-10 PROCEDURE — 99284 EMERGENCY DEPT VISIT MOD MDM: CPT

## 2025-02-10 PROCEDURE — 73060 X-RAY EXAM OF HUMERUS: CPT | Mod: 26,LT

## 2025-02-10 RX ORDER — LIDOCAINE HYDROCHLORIDE 30 MG/G
1 CREAM TOPICAL
Qty: 1 | Refills: 0
Start: 2025-02-10 | End: 2025-02-14

## 2025-02-10 RX ORDER — LIDOCAINE HYDROCHLORIDE 30 MG/G
1 CREAM TOPICAL ONCE
Refills: 0 | Status: COMPLETED | OUTPATIENT
Start: 2025-02-10 | End: 2025-02-10

## 2025-02-10 RX ORDER — IBUPROFEN 600 MG/1
600 TABLET, FILM COATED ORAL ONCE
Refills: 0 | Status: COMPLETED | OUTPATIENT
Start: 2025-02-10 | End: 2025-02-10

## 2025-02-10 RX ADMIN — LIDOCAINE HYDROCHLORIDE 1 PATCH: 30 CREAM TOPICAL at 20:58

## 2025-02-10 RX ADMIN — IBUPROFEN 600 MILLIGRAM(S): 600 TABLET, FILM COATED ORAL at 20:57
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
## 2025-02-10 NOTE — ED PROVIDER NOTE - PATIENT PORTAL LINK FT
You can access the FollowMyHealth Patient Portal offered by [facility] by registering at the following website: [email or web address]. By joining [employer]’s FollowMyHealth portal, you will also be able to view your health information using other applications (apps) compatible with our system.

## 2025-02-10 NOTE — ED PROVIDER NOTE - PHYSICAL EXAMINATION
MSK: no midline spinal tenderness or stepoffs palpated, FROM of spine, +left low back paraspinal muscle tenderness  +mild tenderness along left posterior lower 2 ribs and intercostals  no flank ecchymoses

## 2025-02-10 NOTE — ED PROVIDER NOTE - PROGRESS NOTE DETAILS
LEANNE Hussein: xray showing incidental finding of sclerotic irregularity to left humerus mid-shaft. Discussed xray findings with ortho, agree with ortho-onc follow up, information provided for . Showed pt xray images and discussed results with recommendation to follow up with ortho-onc within 1 week. Pt reports improvement in back pain s/p medication. pt does not have left arm pain. LEANNE Hussein: xray showing incidental finding of sclerotic irregularity to left humerus mid-shaft. Discussed xray findings with ortho, agree with ortho-onc follow up, information provided for . Showed pt xray images and discussed results with recommendation to follow up with ortho-onc within 1 week, discussed that xray findings are concerning for a sclerotic/lytic lesion and differential including cancer. Pt reports improvement in back pain s/p medication. pt does not have left arm pain. LEANNE Hussein: xray showing incidental finding of sclerotic irregularity to left humerus mid-shaft. Discussed xray findings with ortho, agree with ortho-onc follow up, information provided for . Showed pt xray images and discussed results with recommendation to follow up with ortho-onc within 1 week, discussed that xray findings are concerning for a sclerotic/lytic lesion and differential including cancer, pt does report he fractured the left arm when he was a child. Pt reports improvement in back pain s/p medication. pt does not have left arm pain.
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
## 2025-02-10 NOTE — ED ADULT TRIAGE NOTE - NS ED TRIAGE AVPU SCALE
Alert-The patient is alert, awake and responds to voice. The patient is oriented to time, place, and person. The triage nurse is able to obtain subjective information.
Spontaneous, unlabored and symmetrical

## 2025-02-10 NOTE — ED PROVIDER NOTE - NSFOLLOWUPINSTRUCTIONS_ED_ALL_ED_FT
Follow-up with an orthopedic doctor within 1 week, show copies of your x-ray results and discuss further evaluation  Follow-up with your primary care doctor within 1 week  Take ibuprofen 600 mg every 6-8 hours as needed for pain, take with food  – You can also take Tylenol 650 mg every 6 hours  Apply lidocaine patch to area, leave patch on for 12 hours and remove patch for 12 hours before applying a new patch (lidocaine patches or 4% also known as Salonpas)  Take cyclobenzaprine 5 mg (1 tablet) every 8 hours as needed for muscle spasm, caution drowsiness do not drive or drink alcohol while taking  Return to the ER with any worsening or concerning symptoms increased pain, numbness, tingling, weakness or any other concerns. Follow-up with an orthopedic doctor (ortho-oncology) within 1 week, office information listed above for , please call to make an appointment, show copies of your x-ray results and discuss further evaluation  Follow-up with your primary care doctor within 1 week  Take ibuprofen 600 mg every 6-8 hours as needed for pain, take with food  – You can also take Tylenol 650 mg every 6 hours  Apply lidocaine patch to area, leave patch on for 12 hours and remove patch for 12 hours before applying a new patch (lidocaine patches or 4% also known as Salonpas)  Take cyclobenzaprine 5 mg (1 tablet) every 8 hours as needed for muscle spasm, caution drowsiness do not drive or drink alcohol while taking  Return to the ER with any worsening or concerning symptoms increased pain, numbness, tingling, weakness or any other concerns.
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
## 2025-02-10 NOTE — ED PROVIDER NOTE - CARE PROVIDER_API CALL
Giancarlo Freeman  Musculoskeletal Oncology  [address] [place] [address]  Phone: [phone]  Fax: ()-  Follow Up Time:
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
## 2025-02-11 ENCOUNTER — NON-APPOINTMENT (OUTPATIENT)
Age: 30
End: 2025-02-11

## 2025-02-11 PROBLEM — Z86.69 PERSONAL HISTORY OF OTHER DISEASES OF THE NERVOUS SYSTEM AND SENSE ORGANS: Chronic | Status: ACTIVE | Noted: 2022-11-01

## 2025-02-14 ENCOUNTER — APPOINTMENT (OUTPATIENT)
Dept: ORTHOPEDIC SURGERY | Facility: CLINIC | Age: 30
End: 2025-02-14
Payer: COMMERCIAL

## 2025-02-14 DIAGNOSIS — M89.9 DISORDER OF BONE, UNSPECIFIED: ICD-10-CM

## 2025-02-14 PROCEDURE — 99203 OFFICE O/P NEW LOW 30 MIN: CPT

## 2025-02-18 ENCOUNTER — NON-APPOINTMENT (OUTPATIENT)
Age: 30
End: 2025-02-18